# Patient Record
Sex: FEMALE | Race: WHITE | NOT HISPANIC OR LATINO | Employment: OTHER
[De-identification: names, ages, dates, MRNs, and addresses within clinical notes are randomized per-mention and may not be internally consistent; named-entity substitution may affect disease eponyms.]

---

## 2017-12-04 ENCOUNTER — RECORDS - HEALTHEAST (OUTPATIENT)
Dept: ADMINISTRATIVE | Facility: OTHER | Age: 77
End: 2017-12-04

## 2020-01-27 ENCOUNTER — RECORDS - HEALTHEAST (OUTPATIENT)
Dept: ADMINISTRATIVE | Facility: OTHER | Age: 80
End: 2020-01-27

## 2021-05-24 ENCOUNTER — RECORDS - HEALTHEAST (OUTPATIENT)
Dept: ADMINISTRATIVE | Facility: CLINIC | Age: 81
End: 2021-05-24

## 2021-05-25 ENCOUNTER — RECORDS - HEALTHEAST (OUTPATIENT)
Dept: ADMINISTRATIVE | Facility: CLINIC | Age: 81
End: 2021-05-25

## 2021-05-26 ENCOUNTER — RECORDS - HEALTHEAST (OUTPATIENT)
Dept: ADMINISTRATIVE | Facility: CLINIC | Age: 81
End: 2021-05-26

## 2021-05-27 ENCOUNTER — RECORDS - HEALTHEAST (OUTPATIENT)
Dept: ADMINISTRATIVE | Facility: CLINIC | Age: 81
End: 2021-05-27

## 2021-05-28 ENCOUNTER — RECORDS - HEALTHEAST (OUTPATIENT)
Dept: ADMINISTRATIVE | Facility: CLINIC | Age: 81
End: 2021-05-28

## 2021-05-29 ENCOUNTER — RECORDS - HEALTHEAST (OUTPATIENT)
Dept: ADMINISTRATIVE | Facility: CLINIC | Age: 81
End: 2021-05-29

## 2021-05-30 ENCOUNTER — RECORDS - HEALTHEAST (OUTPATIENT)
Dept: ADMINISTRATIVE | Facility: CLINIC | Age: 81
End: 2021-05-30

## 2021-05-31 ENCOUNTER — RECORDS - HEALTHEAST (OUTPATIENT)
Dept: ADMINISTRATIVE | Facility: CLINIC | Age: 81
End: 2021-05-31

## 2021-06-02 ENCOUNTER — RECORDS - HEALTHEAST (OUTPATIENT)
Dept: ADMINISTRATIVE | Facility: CLINIC | Age: 81
End: 2021-06-02

## 2021-07-13 ENCOUNTER — RECORDS - HEALTHEAST (OUTPATIENT)
Dept: ADMINISTRATIVE | Facility: CLINIC | Age: 81
End: 2021-07-13

## 2021-07-21 ENCOUNTER — RECORDS - HEALTHEAST (OUTPATIENT)
Dept: ADMINISTRATIVE | Facility: CLINIC | Age: 81
End: 2021-07-21

## 2022-03-21 ENCOUNTER — HOSPITAL ENCOUNTER (EMERGENCY)
Facility: CLINIC | Age: 82
Discharge: HOME | End: 2022-03-21
Attending: EMERGENCY MEDICINE | Admitting: EMERGENCY MEDICINE
Payer: MEDICARE

## 2022-03-21 ENCOUNTER — APPOINTMENT (OUTPATIENT)
Dept: ULTRASOUND IMAGING | Facility: CLINIC | Age: 82
End: 2022-03-21
Attending: EMERGENCY MEDICINE
Payer: MEDICARE

## 2022-03-21 VITALS
HEART RATE: 65 BPM | BODY MASS INDEX: 25.78 KG/M2 | HEIGHT: 64 IN | OXYGEN SATURATION: 98 % | RESPIRATION RATE: 18 BRPM | SYSTOLIC BLOOD PRESSURE: 122 MMHG | DIASTOLIC BLOOD PRESSURE: 68 MMHG | WEIGHT: 151 LBS | TEMPERATURE: 98.1 F

## 2022-03-21 DIAGNOSIS — M79.604 BILATERAL LEG PAIN: ICD-10-CM

## 2022-03-21 DIAGNOSIS — M79.605 BILATERAL LEG PAIN: ICD-10-CM

## 2022-03-21 LAB
ANION GAP SERPL CALCULATED.3IONS-SCNC: 10 MMOL/L (ref 5–18)
BASOPHILS # BLD AUTO: 0.1 10E3/UL (ref 0–0.2)
BASOPHILS NFR BLD AUTO: 2 %
BUN SERPL-MCNC: 11 MG/DL (ref 8–28)
CALCIUM SERPL-MCNC: 9.1 MG/DL (ref 8.5–10.5)
CHLORIDE BLD-SCNC: 99 MMOL/L (ref 98–107)
CK SERPL-CCNC: 256 U/L (ref 30–190)
CO2 SERPL-SCNC: 21 MMOL/L (ref 22–31)
CREAT SERPL-MCNC: 0.77 MG/DL (ref 0.6–1.1)
EOSINOPHIL # BLD AUTO: 0.2 10E3/UL (ref 0–0.7)
EOSINOPHIL NFR BLD AUTO: 6 %
ERYTHROCYTE [DISTWIDTH] IN BLOOD BY AUTOMATED COUNT: 14.5 % (ref 10–15)
GFR SERPL CREATININE-BSD FRML MDRD: 78 ML/MIN/1.73M2
GLUCOSE BLD-MCNC: 100 MG/DL (ref 70–125)
HCT VFR BLD AUTO: 39.7 % (ref 35–47)
HGB BLD-MCNC: 13.5 G/DL (ref 11.7–15.7)
IMM GRANULOCYTES # BLD: 0 10E3/UL
IMM GRANULOCYTES NFR BLD: 0 %
LYMPHOCYTES # BLD AUTO: 1.5 10E3/UL (ref 0.8–5.3)
LYMPHOCYTES NFR BLD AUTO: 39 %
MCH RBC QN AUTO: 29.7 PG (ref 26.5–33)
MCHC RBC AUTO-ENTMCNC: 34 G/DL (ref 31.5–36.5)
MCV RBC AUTO: 87 FL (ref 78–100)
MONOCYTES # BLD AUTO: 0.5 10E3/UL (ref 0–1.3)
MONOCYTES NFR BLD AUTO: 15 %
NEUTROPHILS # BLD AUTO: 1.4 10E3/UL (ref 1.6–8.3)
NEUTROPHILS NFR BLD AUTO: 38 %
NRBC # BLD AUTO: 0 10E3/UL
NRBC BLD AUTO-RTO: 0 /100
PLATELET # BLD AUTO: 217 10E3/UL (ref 150–450)
POTASSIUM BLD-SCNC: 4.2 MMOL/L (ref 3.5–5)
RBC # BLD AUTO: 4.55 10E6/UL (ref 3.8–5.2)
SODIUM SERPL-SCNC: 130 MMOL/L (ref 136–145)
WBC # BLD AUTO: 3.7 10E3/UL (ref 4–11)

## 2022-03-21 PROCEDURE — 36415 COLL VENOUS BLD VENIPUNCTURE: CPT | Performed by: EMERGENCY MEDICINE

## 2022-03-21 PROCEDURE — 80048 BASIC METABOLIC PNL TOTAL CA: CPT | Performed by: EMERGENCY MEDICINE

## 2022-03-21 PROCEDURE — 93970 EXTREMITY STUDY: CPT

## 2022-03-21 PROCEDURE — 85004 AUTOMATED DIFF WBC COUNT: CPT | Performed by: EMERGENCY MEDICINE

## 2022-03-21 PROCEDURE — 99284 EMERGENCY DEPT VISIT MOD MDM: CPT | Mod: 25

## 2022-03-21 PROCEDURE — 82550 ASSAY OF CK (CPK): CPT | Performed by: EMERGENCY MEDICINE

## 2022-03-21 NOTE — ED PROVIDER NOTES
EMERGENCY DEPARTMENT ENCOUNTER      NAME: Teetee Tanner  AGE: 82 year old female  YOB: 1940  MRN: [id number]  EVALUATION DATE & TIME: 3/21/2022  9:11 AM    PCP: Jasmyn Tony    ED PROVIDER: Juan Garcia M.D.      Chief Complaint   Patient presents with     Leg Pain         FINAL IMPRESSION:  1.  Chronic mitochondrial muscle dysfunction.  2.  Acute bilateral leg pain.      ED COURSE & MEDICAL DECISION MAKIN:14 AM I met with the patient to gather history and to perform my initial exam. We discussed plans for the ED course, including diagnostic testing and treatment. PPE worn: cloth mask.  Patient is underlying mitochondrial muscle dysfunction with recurrent myalgias and myositis.  Symptoms usually in her legs but sometimes in her arms.  Patient notes a bilateral leg pain although right is greater than left.  Symptoms coming on over the last week.  No chest pain, shortness of breath, back pain or hemoptysis.  10:54 AM I rechecked and updated the patient on results.  Sodium a touch low at 130 and CK mildly elevated to 56.  The rest laboratory work unremarkable.  Legs negative for DVT.  Patient will be discharged home.  We talked about ice or heat, Tylenol or ibuprofen.  Patient in agreement.      We discussed the plan for discharge and the patient is agreeable. Reviewed supportive cares, symptomatic treatment, outpatient follow up, and reasons to return to the Emergency Department. All questions and concerns were addressed. Patient to be discharged by ED RN.        Pertinent Labs & Imaging studies reviewed. (See chart for details)  82 year old female presents to the Emergency Department for evaluation of bilateral leg pain.    At the conclusion of the encounter I discussed the results of all of the tests and the disposition. The questions were answered. The patient or family acknowledged understanding and was agreeable with the care plan.       MEDICATIONS GIVEN IN THE  EMERGENCY:  Medications - No data to display    NEW PRESCRIPTIONS STARTED AT TODAY'S ER VISIT  New Prescriptions    No medications on file          =================================================================    HPI    Patient information was obtained from: patient     Use of : N/A        Teetee Tanner is a 82 year old female with a pertinent history of mitochondrial muscle dysfunction, who presents to this ED by walk in for evaluation of leg pain.    Patient presents with 6-7 days of bilateral lower leg pain and swelling (R>L). She reports frequent walking but denies recent falls or trauma. Current pain is improved from last night. Rates current pain 7/10. Patient was chronic leg and occasional arm pain related to mitochondrial muscle dysfunction. She typically uses KT tape but it did not provide pain relief last night. Does not identify any waxing or waning symptoms otherwise, exacerbating or alleviating features, associated symptoms except as mentioned.     REVIEW OF SYSTEMS   Review of Systems   Cardiovascular: Positive for leg swelling (bilateral lower legs, R>L).   Musculoskeletal:        Positive for bilateral lower leg pain (R>L).   All other systems reviewed and are negative.       PAST MEDICAL HISTORY:  No past medical history on file.    PAST SURGICAL HISTORY:  Past Surgical History:   Procedure Laterality Date     EXCISE BREAST CYST/FIBROADENOMA/TUMOR/DUCT LESION/NIPPLE LESION/AREOLAR LESION      Description: Breast Surgery Lumpectomy;  Recorded: 02/27/2008;     HC EXCISION NASAL POLYP(S), EXTENSIVE      Description: Extensive Excision Of Nasal Polyps;  Recorded: 02/27/2008;     HC REMOVE TONSILS/ADENOIDS,<11 Y/O      Description: Tonsillectomy With Adenoidectomy;  Recorded: 02/27/2008;     NM VAGINAL HYSTERECTOMY,UTERUS 250 GMS/<      Description: Vaginal Hysterectomy;  Proc Date: 02/25/2005;  Comments: BSO, Bladder repair           CURRENT MEDICATIONS:    ondansetron (ZOFRAN-ODT) 4  "MG disintegrating tablet        ALLERGIES:  Allergies   Allergen Reactions     Atorvastatin Unknown     Cephalosporins Unknown     Codeine Hives     Diphenhydramine Hcl [Diphenhydramine] Hives     Morphine Unknown     Penicillins Hives     Sulfa (Sulfonamide Antibiotics) [Sulfa Drugs] Hives     Tetracyclines Unknown       FAMILY HISTORY:  No family history on file.    SOCIAL HISTORY:   Social History     Socioeconomic History     Marital status:      Spouse name: Not on file     Number of children: Not on file     Years of education: Not on file     Highest education level: Not on file   Occupational History     Not on file   Tobacco Use     Smoking status: Never Smoker     Smokeless tobacco: Not on file   Substance and Sexual Activity     Alcohol use: No     Drug use: No     Sexual activity: Not on file   Other Topics Concern     Not on file   Social History Narrative     Not on file     Social Determinants of Health     Financial Resource Strain: Not on file   Food Insecurity: Not on file   Transportation Needs: Not on file   Physical Activity: Not on file   Stress: Not on file   Social Connections: Not on file   Intimate Partner Violence: Not on file   Housing Stability: Not on file   No current drugs, alcohol, tobacco.    VITALS:  /77   Pulse 64   Temp 98.1  F (36.7  C) (Tympanic)   Resp 16   Ht 1.626 m (5' 4\")   Wt 68.5 kg (151 lb)   SpO2 96%   BMI 25.92 kg/m      PHYSICAL EXAM    Vital Signs:  /77   Pulse 64   Temp 98.1  F (36.7  C) (Tympanic)   Resp 16   Ht 1.626 m (5' 4\")   Wt 68.5 kg (151 lb)   SpO2 96%   BMI 25.92 kg/m    General:  On entering the room she is in no apparent distress.    Neck:  Neck supple with full range of motion and nontender.    Back:  Back and spine are nontender.  No costovertebral angle tenderness.    HEENT:  Oropharynx clear with moist mucous membranes.  HEENT unremarkable.    Pulmonary:  Chest clear to auscultation without rhonchi rales or wheezing. "    Cardiovascular:  Cardiac regular rate and rhythm without murmurs rubs or gallops.    Abdomen:  Abdomen soft nontender.  There is no rebound or guarding.    Muskuloskeletal:  she moves all 4 without any difficulty and has normal neurovascular exams.  Extremities without clubbing, cyanosis, or edema.  Legs and calves are moderately tender bilaterally.  Right more tender than left.  Right calf proximally 1 inch larger in circumference compared to the left.  Neuro:  she is alert and oriented ×3 and moves all extremities symmetrically.    Psych:  Normal affect.    Skin:  Unremarkable and warm and dry.       LAB:  All pertinent labs reviewed and interpreted.  Labs Ordered and Resulted from Time of ED Arrival to Time of ED Departure   BASIC METABOLIC PANEL - Abnormal       Result Value    Sodium 130 (*)     Potassium 4.2      Chloride 99      Carbon Dioxide (CO2) 21 (*)     Anion Gap 10      Urea Nitrogen 11      Creatinine 0.77      Calcium 9.1      Glucose 100      GFR Estimate 78     CK TOTAL - Abnormal     (*)    CBC WITH PLATELETS AND DIFFERENTIAL - Abnormal    WBC Count 3.7 (*)     RBC Count 4.55      Hemoglobin 13.5      Hematocrit 39.7      MCV 87      MCH 29.7      MCHC 34.0      RDW 14.5      Platelet Count 217      % Neutrophils 38      % Lymphocytes 39      % Monocytes 15      % Eosinophils 6      % Basophils 2      % Immature Granulocytes 0      NRBCs per 100 WBC 0      Absolute Neutrophils 1.4 (*)     Absolute Lymphocytes 1.5      Absolute Monocytes 0.5      Absolute Eosinophils 0.2      Absolute Basophils 0.1      Absolute Immature Granulocytes 0.0      Absolute NRBCs 0.0         RADIOLOGY:  Reviewed all pertinent imaging. Please see official radiology report.  US Lower Extremity Venous Duplex Bilateral   Final Result   IMPRESSION:   1.  No deep venous thrombosis in the bilateral lower extremities.                 PROCEDURES:         Odalis FIGUEREDO, am serving as a scribe to document services  personally performed by Dr. Garcia based on my observation and the provider's statements to me. I, Juan Garcia MD attest that Odalisjenna French is acting in a scribe capacity, has observed my performance of the services and has documented them in accordance with my direction.    Juan Garcia M.D.  Emergency Medicine  [facility] EMERGENCY ROOM  [address] [id number] [phone]  Dept: [phone]       Juan Garcia MD  03/21/22 1057

## 2022-03-21 NOTE — DISCHARGE INSTRUCTIONS
Ice or heat off-and-on to legs can help with pain.  Tylenol or ibuprofen over-the-counter every 6 hours can help as needed and as tolerated.  Follow-up with your doctor in the next week or so to assess progress.

## 2022-03-21 NOTE — ED TRIAGE NOTES
Pt has mytochondrial muscle dysfuntion and has had worsening bilateral leg pain, right worse then left. Pt denies fall. Has not been able to sleep d/t pain. Pt has taken tylenol and tried hot packs. Here to be evaluated.

## 2022-04-14 ENCOUNTER — APPOINTMENT (OUTPATIENT)
Dept: CT IMAGING | Facility: CLINIC | Age: 82
End: 2022-04-14
Attending: EMERGENCY MEDICINE
Payer: MEDICARE

## 2022-04-14 ENCOUNTER — HOSPITAL ENCOUNTER (EMERGENCY)
Facility: CLINIC | Age: 82
Discharge: HOME | End: 2022-04-14
Attending: EMERGENCY MEDICINE | Admitting: EMERGENCY MEDICINE
Payer: MEDICARE

## 2022-04-14 ENCOUNTER — APPOINTMENT (OUTPATIENT)
Dept: RADIOLOGY | Facility: CLINIC | Age: 82
End: 2022-04-14
Attending: EMERGENCY MEDICINE
Payer: MEDICARE

## 2022-04-14 VITALS
SYSTOLIC BLOOD PRESSURE: 195 MMHG | OXYGEN SATURATION: 98 % | WEIGHT: 149 LBS | HEIGHT: 64 IN | DIASTOLIC BLOOD PRESSURE: 100 MMHG | BODY MASS INDEX: 25.44 KG/M2 | HEART RATE: 63 BPM | RESPIRATION RATE: 16 BRPM | TEMPERATURE: 97.6 F

## 2022-04-14 DIAGNOSIS — R42 VERTIGO: ICD-10-CM

## 2022-04-14 LAB
ANION GAP SERPL CALCULATED.3IONS-SCNC: 13 MMOL/L (ref 5–18)
BASOPHILS # BLD AUTO: 0.1 10E3/UL (ref 0–0.2)
BASOPHILS NFR BLD AUTO: 1 %
BUN SERPL-MCNC: 9 MG/DL (ref 8–28)
CALCIUM SERPL-MCNC: 9.5 MG/DL (ref 8.5–10.5)
CHLORIDE BLD-SCNC: 101 MMOL/L (ref 98–107)
CO2 SERPL-SCNC: 19 MMOL/L (ref 22–31)
CREAT SERPL-MCNC: 0.62 MG/DL (ref 0.6–1.1)
EOSINOPHIL # BLD AUTO: 0.2 10E3/UL (ref 0–0.7)
EOSINOPHIL NFR BLD AUTO: 3 %
ERYTHROCYTE [DISTWIDTH] IN BLOOD BY AUTOMATED COUNT: 14.5 % (ref 10–15)
GFR SERPL CREATININE-BSD FRML MDRD: 90 ML/MIN/1.73M2
GLUCOSE BLD-MCNC: 104 MG/DL (ref 70–125)
HCT VFR BLD AUTO: 42 % (ref 35–47)
HGB BLD-MCNC: 14.2 G/DL (ref 11.7–15.7)
IMM GRANULOCYTES # BLD: 0 10E3/UL
IMM GRANULOCYTES NFR BLD: 0 %
LYMPHOCYTES # BLD AUTO: 1.8 10E3/UL (ref 0.8–5.3)
LYMPHOCYTES NFR BLD AUTO: 32 %
MAGNESIUM SERPL-MCNC: 1.9 MG/DL (ref 1.8–2.6)
MCH RBC QN AUTO: 30.1 PG (ref 26.5–33)
MCHC RBC AUTO-ENTMCNC: 33.8 G/DL (ref 31.5–36.5)
MCV RBC AUTO: 89 FL (ref 78–100)
MONOCYTES # BLD AUTO: 0.6 10E3/UL (ref 0–1.3)
MONOCYTES NFR BLD AUTO: 11 %
NEUTROPHILS # BLD AUTO: 3 10E3/UL (ref 1.6–8.3)
NEUTROPHILS NFR BLD AUTO: 53 %
NRBC # BLD AUTO: 0 10E3/UL
NRBC BLD AUTO-RTO: 0 /100
PLATELET # BLD AUTO: 256 10E3/UL (ref 150–450)
POTASSIUM BLD-SCNC: 3.8 MMOL/L (ref 3.5–5)
RBC # BLD AUTO: 4.72 10E6/UL (ref 3.8–5.2)
SODIUM SERPL-SCNC: 133 MMOL/L (ref 136–145)
WBC # BLD AUTO: 5.7 10E3/UL (ref 4–11)

## 2022-04-14 PROCEDURE — 85025 COMPLETE CBC W/AUTO DIFF WBC: CPT | Performed by: EMERGENCY MEDICINE

## 2022-04-14 PROCEDURE — 83735 ASSAY OF MAGNESIUM: CPT | Performed by: EMERGENCY MEDICINE

## 2022-04-14 PROCEDURE — 72072 X-RAY EXAM THORAC SPINE 3VWS: CPT

## 2022-04-14 PROCEDURE — 36415 COLL VENOUS BLD VENIPUNCTURE: CPT | Performed by: EMERGENCY MEDICINE

## 2022-04-14 PROCEDURE — 70450 CT HEAD/BRAIN W/O DYE: CPT

## 2022-04-14 PROCEDURE — 250N000013 HC RX MED GY IP 250 OP 250 PS 637: Performed by: EMERGENCY MEDICINE

## 2022-04-14 PROCEDURE — 258N000003 HC RX IP 258 OP 636: Performed by: EMERGENCY MEDICINE

## 2022-04-14 PROCEDURE — 80048 BASIC METABOLIC PNL TOTAL CA: CPT | Performed by: EMERGENCY MEDICINE

## 2022-04-14 PROCEDURE — 96360 HYDRATION IV INFUSION INIT: CPT

## 2022-04-14 PROCEDURE — 96361 HYDRATE IV INFUSION ADD-ON: CPT

## 2022-04-14 PROCEDURE — 99285 EMERGENCY DEPT VISIT HI MDM: CPT | Mod: 25

## 2022-04-14 PROCEDURE — 93005 ELECTROCARDIOGRAM TRACING: CPT | Performed by: EMERGENCY MEDICINE

## 2022-04-14 RX ORDER — MECLIZINE HYDROCHLORIDE 25 MG/1
25 TABLET ORAL ONCE
Status: COMPLETED | OUTPATIENT
Start: 2022-04-14 | End: 2022-04-14

## 2022-04-14 RX ORDER — SODIUM CHLORIDE 9 MG/ML
INJECTION, SOLUTION INTRAVENOUS CONTINUOUS
Status: DISCONTINUED | OUTPATIENT
Start: 2022-04-14 | End: 2022-04-14

## 2022-04-14 RX ORDER — ONDANSETRON 2 MG/ML
4 INJECTION INTRAMUSCULAR; INTRAVENOUS EVERY 30 MIN PRN
Status: DISCONTINUED | OUTPATIENT
Start: 2022-04-14 | End: 2022-04-14 | Stop reason: HOSPADM

## 2022-04-14 RX ORDER — MECLIZINE HCL 12.5 MG 12.5 MG/1
25 TABLET ORAL 4 TIMES DAILY PRN
Qty: 30 TABLET | Refills: 0 | Status: SHIPPED | OUTPATIENT
Start: 2022-04-14 | End: 2022-07-05

## 2022-04-14 RX ADMIN — MECLIZINE HYDROCHLORIDE 25 MG: 25 TABLET ORAL at 11:56

## 2022-04-14 RX ADMIN — SODIUM CHLORIDE 1000 ML: 9 INJECTION, SOLUTION INTRAVENOUS at 11:54

## 2022-04-14 NOTE — ED TRIAGE NOTES
Patient presents tot he ED with complaints of dizziness that started yesterday. She reports she has experienced this before after reading a lot and using the computer. She reports when she lays down her dizziness resolves.

## 2022-04-14 NOTE — ED PROVIDER NOTES
EMERGENCY DEPARTMENT ENCOUNTER      NAME: Teetee Tanner  AGE: 82 year old female  YOB: 1940  MRN: [id number]  EVALUATION DATE & TIME: 4/14/2022 10:07 AM    PCP: Jasmyn Tony    ED PROVIDER: Dany Orta M.D.      Chief Complaint   Patient presents with     Dizziness     Patient reports dizziness after a long day of reading and extra screen time. She reports this has happened before after straining her eyes. She is concerned as she has fallen several times. She reports that her dizziness is much less today after a nights sleep.       FINAL IMPRESSION:  1. Vertigo        ED COURSE & MEDICAL DECISION MAKING:    Pertinent Labs & Imaging studies reviewed. (See chart for details)  82 year old female presents to the Emergency Department for evaluation of dizziness. Patient appears non toxic with stable vitals signs, patient is afebrile, no tachycardia or hypoxia, no increased work of breathing. Overall exam is benign.  Lungs are clear and abdomen is benign.  Patient has a GCS of 15 with no focal neuro deficits.  She denies any true lightheadedness, no chest pain or shortness of breath, no presyncopal symptoms.  She endorses more of a room spinning, off-balance sensation, she did fall last night onto the carpet and hit her back and head.  Symptoms resolved at rest, given no persistent vertigo, benign neurologic exam, symptoms started last night, certainly no indication for code stroke.  History and exam not consistent with central cause as she has a completely benign neurologic exam.  Considered but low suspicion for intracranial bleed or mass, low suspicion for thoracic bony fracture.  Considered but low suspicion for electrolyte abnormality, dysrhythmia.  With benign neurologic exam, resolution of symptoms at rest, no indication for MRI imaging.  We will obtain CT imaging of the head, screening labs and thoracic spine plain films.  She has no cervical tenderness, ranges her neck freely, nothing  to suggest cervical fracture dislocation.  Patient was given IV fluids, Zofran and meclizine.    Reassessment: Labs and imaging studies reported no acute concerning findings.  ECG reported no acute discerning findings.  Following our interventions patient felt markedly improved and repeat exam is benign, patient states that her symptoms are all but resolved.  She was able to ambulate here without issues.  Likely, peripheral vertigo, suspect benign paroxysmal positional vertigo.  With negative work-up, marked improvement in well appearance feel she is safe for discharge and close follow-up.  Will discharge with a course of meclizine and recommend that she follows up with primary care in the next 5 to 7 days for continued outpatient management evaluation.  Discussed these findings recommendations the patient felt reassured and comfortable discharge.  Return precautions provided.    11:08 AM I met with the patient, obtained history, performed an initial exam, and discussed options and plan for diagnostics and treatment here in the ED.  1:36 PM Rechecked on patient. She feels much improved. Repeat exam is benign. Discussed findings and plan for discharge with close follow up.    At the conclusion of the encounter I discussed the results of all of the tests and the disposition. The questions were answered and return precautions provided. The patient or family acknowledged understanding and was agreeable with the care plan.       MEDICATIONS GIVEN IN THE EMERGENCY:  Medications   0.9% sodium chloride BOLUS (0 mLs Intravenous Stopped 4/14/22 1344)   meclizine (ANTIVERT) tablet 25 mg (25 mg Oral Given 4/14/22 1156)       NEW PRESCRIPTIONS STARTED AT TODAY'S ER VISIT  Discharge Medication List as of 4/14/2022  1:45 PM      START taking these medications    Details   meclizine (ANTIVERT) 12.5 MG tablet Take 2 tablets (25 mg) by mouth 4 times daily as needed for dizziness, Disp-30 tablet, R-0, Local Print            =================================================================    HPI    Patient information was obtained from: Patient    Use of Intrepreter: N/A      Teetee Tanner is a 82 year old female who presents for evaluation of persistent dizziness since last night. Patient reports she began to feel dizzy last night around 8:00 PM after reading a paper copy of a novel and a few hours of watching TV and being on the computer. States that she has had similar feelings of dizziness in the past after a prolonged time of watching TV or being on the computer. She describes her initial feelings of dizziness were more feeling light headed and off balance, as well as pain behind her eyes. She states that she went to bed, but woke up around 12:00 AM to go to the bathroom, and felt more room spinning dizziness when she stood up. She states that she had to sit on the edge of her bed for a few minutes, but when she stood up she fell and hit her back on her dresser, then her head on the carpet. She was able to stand up on her own power. She reports her dizziness is worsened while up and moving around, resolved at rest.    Patient reports that she had an episode of vomiting last night, none today. She endorses back pain from the fall last night, denies any neck pain. She denies chest pain, shortness of breath, or fevers. Denies any bowel or bladder symptoms. Denies any other concerns.      REVIEW OF SYSTEMS   Constitutional:  Denies fever, chills  Respiratory:  Denies productive cough or increased work of breathing  Cardiovascular:  Denies chest pain, palpitations  GI:  Denies abdominal pain or change in bowel or bladder habits. Positive for vomiting (last night).  Musculoskeletal:  Denies any new muscle/joint swelling, neck pain. Positive for back pain.  Skin:  Denies rash   Neurologic:  Denies focal weakness. Positive for light headedness, dizziness.  All systems negative except as marked.     PAST MEDICAL HISTORY:  History  "reviewed. No pertinent past medical history.    PAST SURGICAL HISTORY:  Past Surgical History:   Procedure Laterality Date     EXCISE BREAST CYST/FIBROADENOMA/TUMOR/DUCT LESION/NIPPLE LESION/AREOLAR LESION      Description: Breast Surgery Lumpectomy;  Recorded: 02/27/2008;     HC EXCISION NASAL POLYP(S), EXTENSIVE      Description: Extensive Excision Of Nasal Polyps;  Recorded: 02/27/2008;     HC REMOVE TONSILS/ADENOIDS,<11 Y/O      Description: Tonsillectomy With Adenoidectomy;  Recorded: 02/27/2008;     RI VAGINAL HYSTERECTOMY,UTERUS 250 GMS/<      Description: Vaginal Hysterectomy;  Proc Date: 02/25/2005;  Comments: BSO, Bladder repair         CURRENT MEDICATIONS:    Prior to Admission medications    Medication Sig Start Date End Date Taking? Authorizing Provider   ondansetron (ZOFRAN-ODT) 4 MG disintegrating tablet [ONDANSETRON (ZOFRAN-ODT) 4 MG DISINTEGRATING TABLET] Take 1 tablet (4 mg total) by mouth every 8 (eight) hours as needed. 12/9/18   Kalyn Bermudez MD        ALLERGIES:  Allergies   Allergen Reactions     Atorvastatin Unknown     Cephalosporins Unknown     Codeine Hives     Diphenhydramine Hcl [Diphenhydramine] Hives     Morphine Unknown     Penicillins Hives     Sulfa (Sulfonamide Antibiotics) [Sulfa Drugs] Hives     Tetracyclines Unknown       FAMILY HISTORY:  History reviewed. No pertinent family history.    SOCIAL HISTORY:   Social History     Socioeconomic History     Marital status:    Tobacco Use     Smoking status: Never Smoker   Substance and Sexual Activity     Alcohol use: No     Drug use: No       VITALS:  Patient Vitals for the past 24 hrs:   BP Temp Temp src Pulse Resp SpO2 Height Weight   04/14/22 1356 (!) 195/100 -- -- 63 16 98 % -- --   04/14/22 1108 (!) 192/83 -- -- 59 18 98 % -- --   04/14/22 0958 (!) 180/107 97.6  F (36.4  C) Oral 63 17 100 % 1.626 m (5' 4\") 67.6 kg (149 lb)        PHYSICAL EXAM    Constitutional:  Awake, alert, in no apparent distress  HENT:  " Normocephalic, Atraumatic. Bilateral external ears normal. Oropharynx moist. Nose normal. Neck- Normal range of motion with no guarding, No midline cervical tenderness, Supple, No stridor.   Eyes:  PERRL, EOMI with no signs of entrapment, Conjunctiva normal, No discharge.   Respiratory:  Normal breath sounds, No respiratory distress, No wheezing.    Cardiovascular:  Normal heart rate, Normal rhythm, No appreciable rubs or gallops.   GI:  Soft, No tenderness, No distension, No palpable masses  Musculoskeletal:  Intact distal pulses, No edema. Good range of motion in all major joints. No tenderness to palpation or major deformities noted.  Integument:  Warm, Dry, No erythema, No rash.   Neurologic:  Alert & oriented, Normal motor function, Normal sensory function, No focal deficits noted.  Cranial nerves II through XII intact, no dysarthria or dysmetria with normal finger-to-nose and heel-to-shin testing.  Normal head impulse test, no nystagmus, normal test of skew.  Normal lateral gaze bilaterally.  Psychiatric:  Affect normal, Judgment normal, Mood normal.     LAB:  All pertinent labs reviewed and interpreted.  Results for orders placed or performed during the hospital encounter of 04/14/22   CT Head w/o Contrast    Impression    IMPRESSION:  1.  No CT evidence for acute intracranial process.  2.  Brain atrophy and presumed chronic microvascular ischemic changes as above.   XR Thoracic Spine 3 Views    Impression    IMPRESSION: No fracture. Normal vertebral heights and alignment. Mild to moderate degenerative coronal scoliotic deformity. Moderate multilevel thoracic degenerative disc disease with loss of disc height and marginal osteophytic spurring about the disc   spaces. Normal extraspinal structures.    Basic metabolic panel   Result Value Ref Range    Sodium 133 (L) 136 - 145 mmol/L    Potassium 3.8 3.5 - 5.0 mmol/L    Chloride 101 98 - 107 mmol/L    Carbon Dioxide (CO2) 19 (L) 22 - 31 mmol/L    Anion Gap 13 5  - 18 mmol/L    Urea Nitrogen 9 8 - 28 mg/dL    Creatinine 0.62 0.60 - 1.10 mg/dL    Calcium 9.5 8.5 - 10.5 mg/dL    Glucose 104 70 - 125 mg/dL    GFR Estimate 90 >60 mL/min/1.73m2   Result Value Ref Range    Magnesium 1.9 1.8 - 2.6 mg/dL   CBC with platelets and differential   Result Value Ref Range    WBC Count 5.7 4.0 - 11.0 10e3/uL    RBC Count 4.72 3.80 - 5.20 10e6/uL    Hemoglobin 14.2 11.7 - 15.7 g/dL    Hematocrit 42.0 35.0 - 47.0 %    MCV 89 78 - 100 fL    MCH 30.1 26.5 - 33.0 pg    MCHC 33.8 31.5 - 36.5 g/dL    RDW 14.5 10.0 - 15.0 %    Platelet Count 256 150 - 450 10e3/uL    % Neutrophils 53 %    % Lymphocytes 32 %    % Monocytes 11 %    % Eosinophils 3 %    % Basophils 1 %    % Immature Granulocytes 0 %    NRBCs per 100 WBC 0 <1 /100    Absolute Neutrophils 3.0 1.6 - 8.3 10e3/uL    Absolute Lymphocytes 1.8 0.8 - 5.3 10e3/uL    Absolute Monocytes 0.6 0.0 - 1.3 10e3/uL    Absolute Eosinophils 0.2 0.0 - 0.7 10e3/uL    Absolute Basophils 0.1 0.0 - 0.2 10e3/uL    Absolute Immature Granulocytes 0.0 <=0.4 10e3/uL    Absolute NRBCs 0.0 10e3/uL       RADIOLOGY:  XR Thoracic Spine 3 Views   Final Result   IMPRESSION: No fracture. Normal vertebral heights and alignment. Mild to moderate degenerative coronal scoliotic deformity. Moderate multilevel thoracic degenerative disc disease with loss of disc height and marginal osteophytic spurring about the disc    spaces. Normal extraspinal structures.       CT Head w/o Contrast   Final Result   IMPRESSION:   1.  No CT evidence for acute intracranial process.   2.  Brain atrophy and presumed chronic microvascular ischemic changes as above.           EKG:    Sinus bradycardia, no specific ST acute ischemic changes, no concerning dysrhythmias or interval prolongation, no priors for comparison  I have independently reviewed and interpreted the EKG(s) documented above.      I, Luis F Posada, am serving as a scribe to document services personally performed by Dany Orta,  MD, based on my observation and the provider's statements to me. I, Dany Orta MD attest that Luis F Posada is acting in a scribe capacity, has observed my performance of the services and has documented them in accordance with my direction.    Dany Orta M.D.  Emergency Medicine  [facility] EMERGENCY ROOM  [address] [facility] [place] [address]  [phone]  Dept: [phone]     Dany Orta MD  04/14/22 6414

## 2022-04-15 LAB
ATRIAL RATE - MUSE: 58 BPM
DIASTOLIC BLOOD PRESSURE - MUSE: NORMAL MMHG
INTERPRETATION ECG - MUSE: NORMAL
P AXIS - MUSE: 42 DEGREES
PR INTERVAL - MUSE: 168 MS
QRS DURATION - MUSE: 94 MS
QT - MUSE: 478 MS
QTC - MUSE: 469 MS
R AXIS - MUSE: -25 DEGREES
SYSTOLIC BLOOD PRESSURE - MUSE: NORMAL MMHG
T AXIS - MUSE: 82 DEGREES
VENTRICULAR RATE- MUSE: 58 BPM

## 2022-05-13 ENCOUNTER — APPOINTMENT (OUTPATIENT)
Dept: CT IMAGING | Facility: CLINIC | Age: 82
End: 2022-05-13
Payer: MEDICARE

## 2022-05-13 ENCOUNTER — HOSPITAL ENCOUNTER (EMERGENCY)
Facility: CLINIC | Age: 82
Discharge: HOME | End: 2022-05-13
Admitting: PHYSICIAN ASSISTANT
Payer: MEDICARE

## 2022-05-13 VITALS
RESPIRATION RATE: 16 BRPM | SYSTOLIC BLOOD PRESSURE: 156 MMHG | OXYGEN SATURATION: 99 % | DIASTOLIC BLOOD PRESSURE: 83 MMHG | HEIGHT: 64 IN | TEMPERATURE: 97.8 F | WEIGHT: 150 LBS | HEART RATE: 77 BPM | BODY MASS INDEX: 25.61 KG/M2

## 2022-05-13 DIAGNOSIS — R10.11 RUQ ABDOMINAL PAIN: ICD-10-CM

## 2022-05-13 DIAGNOSIS — K29.70 GASTRITIS: ICD-10-CM

## 2022-05-13 PROBLEM — M16.12 LOCALIZED OSTEOARTHROSIS OF LEFT HIP: Status: ACTIVE | Noted: 2018-04-19

## 2022-05-13 PROBLEM — M18.12 ARTHRITIS OF CARPOMETACARPAL (CMC) JOINT OF LEFT THUMB: Status: ACTIVE | Noted: 2019-09-16

## 2022-05-13 PROBLEM — Z96.642 H/O TOTAL HIP ARTHROPLASTY, LEFT: Status: ACTIVE | Noted: 2018-09-11

## 2022-05-13 PROBLEM — D12.6 ADENOMATOUS POLYP OF COLON: Status: ACTIVE | Noted: 2017-09-20

## 2022-05-13 PROBLEM — M25.552 PAIN OF LEFT HIP JOINT: Status: ACTIVE | Noted: 2018-04-19

## 2022-05-13 LAB
ALBUMIN SERPL-MCNC: 3.8 G/DL (ref 3.5–5)
ALP SERPL-CCNC: 64 U/L (ref 45–120)
ALT SERPL W P-5'-P-CCNC: 16 U/L (ref 0–45)
ANION GAP SERPL CALCULATED.3IONS-SCNC: 6 MMOL/L (ref 5–18)
AST SERPL W P-5'-P-CCNC: 28 U/L (ref 0–40)
BASOPHILS # BLD AUTO: 0.1 10E3/UL (ref 0–0.2)
BASOPHILS NFR BLD AUTO: 2 %
BILIRUB DIRECT SERPL-MCNC: 0.1 MG/DL
BILIRUB SERPL-MCNC: 0.6 MG/DL (ref 0–1)
BUN SERPL-MCNC: 6 MG/DL (ref 8–28)
C REACTIVE PROTEIN LHE: <0.1 MG/DL (ref 0–0.8)
CALCIUM SERPL-MCNC: 9.3 MG/DL (ref 8.5–10.5)
CHLORIDE BLD-SCNC: 101 MMOL/L (ref 98–107)
CO2 SERPL-SCNC: 26 MMOL/L (ref 22–31)
CREAT SERPL-MCNC: 0.69 MG/DL (ref 0.6–1.1)
EOSINOPHIL # BLD AUTO: 0.3 10E3/UL (ref 0–0.7)
EOSINOPHIL NFR BLD AUTO: 8 %
ERYTHROCYTE [DISTWIDTH] IN BLOOD BY AUTOMATED COUNT: 14.8 % (ref 10–15)
GFR SERPL CREATININE-BSD FRML MDRD: 87 ML/MIN/1.73M2
GLUCOSE BLD-MCNC: 99 MG/DL (ref 70–125)
HCT VFR BLD AUTO: 39.8 % (ref 35–47)
HGB BLD-MCNC: 13.4 G/DL (ref 11.7–15.7)
HOLD SPECIMEN: NORMAL
IMM GRANULOCYTES # BLD: 0 10E3/UL
IMM GRANULOCYTES NFR BLD: 0 %
INR PPP: 1.02 (ref 0.85–1.15)
LIPASE SERPL-CCNC: 41 U/L (ref 0–52)
LYMPHOCYTES # BLD AUTO: 1.7 10E3/UL (ref 0.8–5.3)
LYMPHOCYTES NFR BLD AUTO: 38 %
MAGNESIUM SERPL-MCNC: 1.9 MG/DL (ref 1.8–2.6)
MCH RBC QN AUTO: 29.7 PG (ref 26.5–33)
MCHC RBC AUTO-ENTMCNC: 33.7 G/DL (ref 31.5–36.5)
MCV RBC AUTO: 88 FL (ref 78–100)
MONOCYTES # BLD AUTO: 0.6 10E3/UL (ref 0–1.3)
MONOCYTES NFR BLD AUTO: 13 %
NEUTROPHILS # BLD AUTO: 1.7 10E3/UL (ref 1.6–8.3)
NEUTROPHILS NFR BLD AUTO: 39 %
NRBC # BLD AUTO: 0 10E3/UL
NRBC BLD AUTO-RTO: 0 /100
PLATELET # BLD AUTO: 238 10E3/UL (ref 150–450)
POTASSIUM BLD-SCNC: 3.9 MMOL/L (ref 3.5–5)
PROT SERPL-MCNC: 7 G/DL (ref 6–8)
RBC # BLD AUTO: 4.51 10E6/UL (ref 3.8–5.2)
SODIUM SERPL-SCNC: 133 MMOL/L (ref 136–145)
WBC # BLD AUTO: 4.4 10E3/UL (ref 4–11)

## 2022-05-13 PROCEDURE — 250N000011 HC RX IP 250 OP 636: Performed by: PHYSICIAN ASSISTANT

## 2022-05-13 PROCEDURE — 85025 COMPLETE CBC W/AUTO DIFF WBC: CPT | Performed by: EMERGENCY MEDICINE

## 2022-05-13 PROCEDURE — 83690 ASSAY OF LIPASE: CPT | Performed by: EMERGENCY MEDICINE

## 2022-05-13 PROCEDURE — 86140 C-REACTIVE PROTEIN: CPT | Performed by: EMERGENCY MEDICINE

## 2022-05-13 PROCEDURE — 99285 EMERGENCY DEPT VISIT HI MDM: CPT | Mod: 25

## 2022-05-13 PROCEDURE — 250N000013 HC RX MED GY IP 250 OP 250 PS 637: Performed by: PHYSICIAN ASSISTANT

## 2022-05-13 PROCEDURE — 36415 COLL VENOUS BLD VENIPUNCTURE: CPT | Performed by: EMERGENCY MEDICINE

## 2022-05-13 PROCEDURE — 74177 CT ABD & PELVIS W/CONTRAST: CPT

## 2022-05-13 PROCEDURE — 82248 BILIRUBIN DIRECT: CPT | Performed by: EMERGENCY MEDICINE

## 2022-05-13 PROCEDURE — 83735 ASSAY OF MAGNESIUM: CPT | Performed by: EMERGENCY MEDICINE

## 2022-05-13 PROCEDURE — 85610 PROTHROMBIN TIME: CPT | Performed by: EMERGENCY MEDICINE

## 2022-05-13 PROCEDURE — 250N000009 HC RX 250: Performed by: PHYSICIAN ASSISTANT

## 2022-05-13 RX ORDER — IOPAMIDOL 755 MG/ML
100 INJECTION, SOLUTION INTRAVASCULAR ONCE
Status: COMPLETED | OUTPATIENT
Start: 2022-05-13 | End: 2022-05-13

## 2022-05-13 RX ADMIN — IOPAMIDOL 100 ML: 755 INJECTION, SOLUTION INTRAVENOUS at 13:00

## 2022-05-13 RX ADMIN — ALUMINUM HYDROXIDE, MAGNESIUM HYDROXIDE, AND SIMETHICONE 30 ML: 200; 200; 20 SUSPENSION ORAL at 12:06

## 2022-05-13 ASSESSMENT — ENCOUNTER SYMPTOMS
ABDOMINAL PAIN: 1
VOMITING: 0
DIARRHEA: 0
FATIGUE: 0
RESPIRATORY NEGATIVE: 1
FEVER: 0
NAUSEA: 0
CARDIOVASCULAR NEGATIVE: 1
MUSCULOSKELETAL NEGATIVE: 1
CHILLS: 0

## 2022-05-13 NOTE — DISCHARGE INSTRUCTIONS
Your work-up here in the emergency department is unremarkable for acute pathology.  There are findings on the CT scan that have been previously documented on your MRI that will need further work-up.    In the meantime we will attempt a 2-week course of omeprazole therapy.  This may help your symptoms of abdominal pain that seem to recur with eating.  Please follow-up with your primary care as needed.

## 2022-05-13 NOTE — ED PROVIDER NOTES
EMERGENCY DEPARTMENT ENCOUNTER      NAME: Teetee Tanner  AGE: 82 year old female  YOB: 1940  MRN: [id number]  EVALUATION DATE & TIME: No admission date for patient encounter.    PCP: Jasmyn Tony    ED PROVIDER: Vinod Radford PA-C      Chief Complaint   Patient presents with     Abdominal Pain         FINAL IMPRESSION:  1. RUQ abdominal pain    2. Gastritis          MEDICAL DECISION MAKING:    Pertinent Labs & Imaging studies reviewed. (See chart for details)  82 year old female presents to the Emergency Department for evaluation of right-sided abdominal pain.    After obtaining history present illness, reviewing vitals, examining the patient and reviewing labs that have been drawn as well as her work-up as an outpatient I did recommend that we acutely image her abdomen in some fashion whether it be CT scan or ultrasound today for reassurance that nothing acutely has changed since her last imaging.  She has had a previous right upper quadrant ultrasound on 5/4, and then a follow-up MRI on 5/9.  There are findings on the studies that require repeat outpatient follow-up in 6 months, however it did not appear that there is anything acute on these images.  Nothing obvious for the cause of her right upper quadrant pain that has been present for weeks.  At this point time the patient feels comfortable with a CT scan.  All the screening labs are unremarkable at this time.    Patient CT scan returns reassuring.  No acute pathology that would require hospitalization or surgical intervention from an emergent standpoint.  At this point time plan to have the patient trial a course of omeprazole for 2 weeks and follow-up with her primary care as needed.  Primary care will be following the MRI and CT findings with regards to the cystic lesions on the liver and kidneys for outpatient follow-up as needed.  Overall patient happy with plan of care.    ED COURSE    I met with the patient, obtained history,  performed an initial exam, and discussed options and plan for diagnostics and treatment here in the ED.    At the conclusion of the encounter I discussed the results of all of the tests and the disposition. The questions were answered. The patient or family acknowledged understanding and was agreeable with the care plan.     MEDICATIONS GIVEN IN THE EMERGENCY:  Medications   lidocaine (viscous) (XYLOCAINE) 2 % 15 mL, alum & mag hydroxide-simethicone (MAALOX) 15 mL GI Cocktail (30 mLs Oral Given 5/13/22 1206)   iopamidol (ISOVUE-370) solution 100 mL (100 mLs Intravenous Given 5/13/22 1300)       NEW PRESCRIPTIONS STARTED AT TODAY'S ER VISIT  New Prescriptions    OMEPRAZOLE (PRILOSEC) 20 MG DR CAPSULE    Take 1 capsule (20 mg) by mouth 2 times daily for 15 days            =================================================================    HPI    Patient information was obtained from:   Teetee Tanner is a 82 year old female with a pertinent history of acid reflux who presents to this ED for evaluation of continued complaints of right-sided abdominal pain.  Patient reports that for the last 2 to 3 weeks she has had off-and-on right-sided abdominal pain that seems to worsen when she eats.  During follow-up through her primary care clinic she brought this up and she did have some screening blood work as well as a right upper quadrant ultrasound performed.  The ultrasound did reveal gallbladder polyps all of which are small but no gallbladder wall thickening and no obvious gallstones.  There was also a large renal cyst reported were recommendations for outpatient MRI were made.  She did have the MRI performed on 5/9 and ultimately there are findings that basically they are recommending 6-month follow-up imaging.  Patient is concerned about these findings as her  had multiple myeloma.  She is very anxious about a more concerning diagnosis rather than benign cysts.  She is also concerned that she has not had pain  on this side where the renal cyst and gallbladder polyps are so she seems still concerned that there is no solution for her pain that she has been feeling over the last 2 to 3 weeks.  She originally thought this was indigestion.  She denies any acute chest pain or acute shortness of breath.  No reports of cough.      REVIEW OF SYSTEMS   Review of Systems   Constitutional: Negative for chills, fatigue and fever.   Respiratory: Negative.    Cardiovascular: Negative.    Gastrointestinal: Positive for abdominal pain. Negative for diarrhea, nausea and vomiting.   Genitourinary: Negative.    Musculoskeletal: Negative.    Skin: Negative.    All other systems reviewed and are negative.         PAST MEDICAL HISTORY:  No past medical history on file.    PAST SURGICAL HISTORY:  Past Surgical History:   Procedure Laterality Date     EXCISE BREAST CYST/FIBROADENOMA/TUMOR/DUCT LESION/NIPPLE LESION/AREOLAR LESION      Description: Breast Surgery Lumpectomy;  Recorded: 02/27/2008;     HC EXCISION NASAL POLYP(S), EXTENSIVE      Description: Extensive Excision Of Nasal Polyps;  Recorded: 02/27/2008;     HC REMOVE TONSILS/ADENOIDS,<11 Y/O      Description: Tonsillectomy With Adenoidectomy;  Recorded: 02/27/2008;     [place] VAGINAL HYSTERECTOMY,UTERUS 250 GMS/<      Description: Vaginal Hysterectomy;  Proc Date: 02/25/2005;  Comments: BSO, Bladder repair         CURRENT MEDICATIONS:    No current facility-administered medications for this encounter.    Current Outpatient Medications:      omeprazole (PRILOSEC) 20 MG DR capsule, Take 1 capsule (20 mg) by mouth 2 times daily for 15 days, Disp: 30 capsule, Rfl: 0     meclizine (ANTIVERT) 12.5 MG tablet, Take 2 tablets (25 mg) by mouth 4 times daily as needed for dizziness, Disp: 30 tablet, Rfl: 0     ondansetron (ZOFRAN-ODT) 4 MG disintegrating tablet, [ONDANSETRON (ZOFRAN-ODT) 4 MG DISINTEGRATING TABLET] Take 1 tablet (4 mg total) by mouth every 8 (eight) hours as needed., Disp: 12 tablet,  "Rfl: 0      ALLERGIES:  Allergies   Allergen Reactions     Atorvastatin Unknown     Cephalosporins Unknown     Codeine Hives     Diphenhydramine Hcl [Diphenhydramine] Hives     Morphine Unknown     Penicillins Hives     Sulfa (Sulfonamide Antibiotics) [Sulfa Drugs] Hives     Tetracyclines Unknown       FAMILY HISTORY:  No family history on file.    SOCIAL HISTORY:   Social History     Socioeconomic History     Marital status:    Tobacco Use     Smoking status: Never Smoker   Substance and Sexual Activity     Alcohol use: No     Drug use: No       VITALS:  Patient Vitals for the past 24 hrs:   BP Temp Temp src Pulse Resp SpO2 Height Weight   05/13/22 1203 (!) 156/83 -- -- 57 18 95 % -- --   05/13/22 0946 (!) 162/92 97.8  F (36.6  C) Temporal 65 16 96 % 1.626 m (5' 4\") 68 kg (150 lb)       PHYSICAL EXAM    Physical Exam  Vitals and nursing note reviewed.   Constitutional:       General: She is not in acute distress.     Appearance: She is normal weight. She is not ill-appearing or toxic-appearing.   HENT:      Head: Normocephalic.      Right Ear: External ear normal.      Left Ear: External ear normal.   Eyes:      General: No scleral icterus.     Conjunctiva/sclera: Conjunctivae normal.      Pupils: Pupils are equal, round, and reactive to light.   Cardiovascular:      Rate and Rhythm: Normal rate.   Pulmonary:      Effort: Pulmonary effort is normal. No respiratory distress.      Breath sounds: No wheezing.   Abdominal:      General: Abdomen is flat.      Palpations: Abdomen is soft.      Comments: Mild reproducible right right upper quadrant tenderness without guarding or rebound present.  No distention.   Musculoskeletal:         General: No swelling, deformity or signs of injury. Normal range of motion.      Cervical back: Normal range of motion.   Skin:     General: Skin is warm and dry.      Coloration: Skin is not jaundiced.   Neurological:      General: No focal deficit present.      Mental Status: She " is alert. Mental status is at baseline.      Sensory: No sensory deficit.      Motor: No weakness.   Psychiatric:         Mood and Affect: Mood normal.          LAB:  All pertinent labs reviewed and interpreted.  Results for orders placed or performed during the hospital encounter of 05/13/22   CT Abdomen Pelvis w Contrast    Impression    IMPRESSION:     1.  No obvious acute findings to explain symptoms.    2.  Hepatic and renal complex cystic change, as previously seen on recent MRI.       Hepatic function panel   Result Value Ref Range    Bilirubin Total 0.6 0.0 - 1.0 mg/dL    Bilirubin Direct 0.1 <=0.5 mg/dL    Protein Total 7.0 6.0 - 8.0 g/dL    Albumin 3.8 3.5 - 5.0 g/dL    Alkaline Phosphatase 64 45 - 120 U/L    AST 28 0 - 40 U/L    ALT 16 0 - 45 U/L   Result Value Ref Range    Lipase 41 0 - 52 U/L   Basic metabolic panel   Result Value Ref Range    Sodium 133 (L) 136 - 145 mmol/L    Potassium 3.9 3.5 - 5.0 mmol/L    Chloride 101 98 - 107 mmol/L    Carbon Dioxide (CO2) 26 22 - 31 mmol/L    Anion Gap 6 5 - 18 mmol/L    Urea Nitrogen 6 (L) 8 - 28 mg/dL    Creatinine 0.69 0.60 - 1.10 mg/dL    Calcium 9.3 8.5 - 10.5 mg/dL    Glucose 99 70 - 125 mg/dL    GFR Estimate 87 >60 mL/min/1.73m2   CRP inflammation   Result Value Ref Range    CRP <0.1 0.0 - 0.8 mg/dL   Result Value Ref Range    INR 1.02 0.85 - 1.15   Result Value Ref Range    Magnesium 1.9 1.8 - 2.6 mg/dL   CBC with platelets and differential   Result Value Ref Range    WBC Count 4.4 4.0 - 11.0 10e3/uL    RBC Count 4.51 3.80 - 5.20 10e6/uL    Hemoglobin 13.4 11.7 - 15.7 g/dL    Hematocrit 39.8 35.0 - 47.0 %    MCV 88 78 - 100 fL    MCH 29.7 26.5 - 33.0 pg    MCHC 33.7 31.5 - 36.5 g/dL    RDW 14.8 10.0 - 15.0 %    Platelet Count 238 150 - 450 10e3/uL    % Neutrophils 39 %    % Lymphocytes 38 %    % Monocytes 13 %    % Eosinophils 8 %    % Basophils 2 %    % Immature Granulocytes 0 %    NRBCs per 100 WBC 0 <1 /100    Absolute Neutrophils 1.7 1.6 - 8.3  10e3/uL    Absolute Lymphocytes 1.7 0.8 - 5.3 10e3/uL    Absolute Monocytes 0.6 0.0 - 1.3 10e3/uL    Absolute Eosinophils 0.3 0.0 - 0.7 10e3/uL    Absolute Basophils 0.1 0.0 - 0.2 10e3/uL    Absolute Immature Granulocytes 0.0 <=0.4 10e3/uL    Absolute NRBCs 0.0 10e3/uL   Extra Green Top (Lithium Heparin) Tube   Result Value Ref Range    Hold Specimen [facility]        RADIOLOGY:  Reviewed all pertinent imaging. Please see official radiology report.  CT Abdomen Pelvis w Contrast   Final Result   IMPRESSION:       1.  No obvious acute findings to explain symptoms.      2.  Hepatic and renal complex cystic change, as previously seen on recent MRI.                  Vinod Radford PA-C  Emergency Medicine  [facility]     Vinod Radford PA-C  05/13/22 9907

## 2022-05-13 NOTE — ED TRIAGE NOTES
Patient reports ongoing chronic RUQ abdominal pain, worsening over time. Reports lump to RUQ, had scan and MRI at [facility] and brought printed results. Pain 1/10 at baseline, increases to 6/10 with eating.     Triage Assessment     Row Name 05/13/22 0948       Triage Assessment (Adult)    Airway WDL WDL       Respiratory WDL    Respiratory WDL WDL       Skin Circulation/Temperature WDL    Skin Circulation/Temperature WDL WDL       Cardiac WDL    Cardiac WDL WDL       Peripheral/Neurovascular WDL    Peripheral Neurovascular WDL WDL       Cognitive/Neuro/Behavioral WDL    Cognitive/Neuro/Behavioral WDL WDL

## 2022-07-05 ENCOUNTER — APPOINTMENT (OUTPATIENT)
Dept: MRI IMAGING | Facility: CLINIC | Age: 82
End: 2022-07-05
Attending: FAMILY MEDICINE
Payer: MEDICARE

## 2022-07-05 ENCOUNTER — HOSPITAL ENCOUNTER (EMERGENCY)
Facility: CLINIC | Age: 82
Discharge: HOME | End: 2022-07-05
Attending: FAMILY MEDICINE | Admitting: FAMILY MEDICINE
Payer: MEDICARE

## 2022-07-05 VITALS
DIASTOLIC BLOOD PRESSURE: 81 MMHG | WEIGHT: 150 LBS | RESPIRATION RATE: 16 BRPM | TEMPERATURE: 98.1 F | BODY MASS INDEX: 25.75 KG/M2 | OXYGEN SATURATION: 98 % | HEART RATE: 57 BPM | SYSTOLIC BLOOD PRESSURE: 195 MMHG

## 2022-07-05 DIAGNOSIS — H81.399 PERIPHERAL VERTIGO, UNSPECIFIED LATERALITY: ICD-10-CM

## 2022-07-05 LAB
ANION GAP SERPL CALCULATED.3IONS-SCNC: 11 MMOL/L (ref 5–18)
ATRIAL RATE - MUSE: 61 BPM
BASOPHILS # BLD AUTO: 0 10E3/UL (ref 0–0.2)
BASOPHILS NFR BLD AUTO: 1 %
BUN SERPL-MCNC: 8 MG/DL (ref 8–28)
CALCIUM SERPL-MCNC: 9.1 MG/DL (ref 8.5–10.5)
CHLORIDE BLD-SCNC: 99 MMOL/L (ref 98–107)
CO2 SERPL-SCNC: 24 MMOL/L (ref 22–31)
CREAT SERPL-MCNC: 0.64 MG/DL (ref 0.6–1.1)
DIASTOLIC BLOOD PRESSURE - MUSE: 86 MMHG
EOSINOPHIL # BLD AUTO: 0 10E3/UL (ref 0–0.7)
EOSINOPHIL NFR BLD AUTO: 0 %
ERYTHROCYTE [DISTWIDTH] IN BLOOD BY AUTOMATED COUNT: 15.7 % (ref 10–15)
GFR SERPL CREATININE-BSD FRML MDRD: 89 ML/MIN/1.73M2
GLUCOSE BLD-MCNC: 140 MG/DL (ref 70–125)
HCT VFR BLD AUTO: 40.1 % (ref 35–47)
HGB BLD-MCNC: 13 G/DL (ref 11.7–15.7)
HOLD SPECIMEN: NORMAL
HOLD SPECIMEN: NORMAL
IMM GRANULOCYTES # BLD: 0 10E3/UL
IMM GRANULOCYTES NFR BLD: 0 %
INTERPRETATION ECG - MUSE: NORMAL
LYMPHOCYTES # BLD AUTO: 0.8 10E3/UL (ref 0.8–5.3)
LYMPHOCYTES NFR BLD AUTO: 10 %
MCH RBC QN AUTO: 30.2 PG (ref 26.5–33)
MCHC RBC AUTO-ENTMCNC: 32.4 G/DL (ref 31.5–36.5)
MCV RBC AUTO: 93 FL (ref 78–100)
MONOCYTES # BLD AUTO: 0.4 10E3/UL (ref 0–1.3)
MONOCYTES NFR BLD AUTO: 5 %
NEUTROPHILS # BLD AUTO: 6.7 10E3/UL (ref 1.6–8.3)
NEUTROPHILS NFR BLD AUTO: 84 %
NRBC # BLD AUTO: 0 10E3/UL
NRBC BLD AUTO-RTO: 0 /100
P AXIS - MUSE: 66 DEGREES
PLATELET # BLD AUTO: 220 10E3/UL (ref 150–450)
POTASSIUM BLD-SCNC: 3.6 MMOL/L (ref 3.5–5)
PR INTERVAL - MUSE: 188 MS
QRS DURATION - MUSE: 102 MS
QT - MUSE: 472 MS
QTC - MUSE: 475 MS
R AXIS - MUSE: -3 DEGREES
RBC # BLD AUTO: 4.31 10E6/UL (ref 3.8–5.2)
SODIUM SERPL-SCNC: 134 MMOL/L (ref 136–145)
SYSTOLIC BLOOD PRESSURE - MUSE: 205 MMHG
T AXIS - MUSE: 81 DEGREES
VENTRICULAR RATE- MUSE: 61 BPM
WBC # BLD AUTO: 8 10E3/UL (ref 4–11)

## 2022-07-05 PROCEDURE — 96375 TX/PRO/DX INJ NEW DRUG ADDON: CPT

## 2022-07-05 PROCEDURE — 85025 COMPLETE CBC W/AUTO DIFF WBC: CPT | Performed by: FAMILY MEDICINE

## 2022-07-05 PROCEDURE — 70553 MRI BRAIN STEM W/O & W/DYE: CPT | Mod: MA

## 2022-07-05 PROCEDURE — 93005 ELECTROCARDIOGRAM TRACING: CPT | Performed by: FAMILY MEDICINE

## 2022-07-05 PROCEDURE — 70549 MR ANGIOGRAPH NECK W/O&W/DYE: CPT | Mod: MA

## 2022-07-05 PROCEDURE — 250N000011 HC RX IP 250 OP 636: Performed by: FAMILY MEDICINE

## 2022-07-05 PROCEDURE — 99285 EMERGENCY DEPT VISIT HI MDM: CPT | Mod: 25

## 2022-07-05 PROCEDURE — 80048 BASIC METABOLIC PNL TOTAL CA: CPT | Performed by: FAMILY MEDICINE

## 2022-07-05 PROCEDURE — 96374 THER/PROPH/DIAG INJ IV PUSH: CPT | Mod: 59

## 2022-07-05 PROCEDURE — 250N000013 HC RX MED GY IP 250 OP 250 PS 637: Performed by: FAMILY MEDICINE

## 2022-07-05 PROCEDURE — 36415 COLL VENOUS BLD VENIPUNCTURE: CPT | Performed by: FAMILY MEDICINE

## 2022-07-05 PROCEDURE — 96376 TX/PRO/DX INJ SAME DRUG ADON: CPT

## 2022-07-05 PROCEDURE — 255N000002 HC RX 255 OP 636: Performed by: FAMILY MEDICINE

## 2022-07-05 PROCEDURE — A9585 GADOBUTROL INJECTION: HCPCS | Performed by: FAMILY MEDICINE

## 2022-07-05 PROCEDURE — 70544 MR ANGIOGRAPHY HEAD W/O DYE: CPT | Mod: MA

## 2022-07-05 RX ORDER — MECLIZINE HYDROCHLORIDE 25 MG/1
25 TABLET ORAL ONCE
Status: COMPLETED | OUTPATIENT
Start: 2022-07-05 | End: 2022-07-05

## 2022-07-05 RX ORDER — ONDANSETRON 2 MG/ML
4 INJECTION INTRAMUSCULAR; INTRAVENOUS ONCE
Status: COMPLETED | OUTPATIENT
Start: 2022-07-05 | End: 2022-07-05

## 2022-07-05 RX ORDER — MECLIZINE HYDROCHLORIDE 25 MG/1
25 TABLET ORAL 4 TIMES DAILY PRN
Qty: 20 TABLET | Refills: 0 | Status: SHIPPED | OUTPATIENT
Start: 2022-07-05

## 2022-07-05 RX ORDER — LORAZEPAM 2 MG/ML
0.5 INJECTION INTRAMUSCULAR ONCE
Status: COMPLETED | OUTPATIENT
Start: 2022-07-05 | End: 2022-07-05

## 2022-07-05 RX ORDER — LORAZEPAM 0.5 MG/1
0.5 TABLET ORAL EVERY 6 HOURS PRN
Qty: 20 TABLET | Refills: 0 | Status: SHIPPED | OUTPATIENT
Start: 2022-07-05

## 2022-07-05 RX ORDER — ONDANSETRON 4 MG/1
4 TABLET, ORALLY DISINTEGRATING ORAL EVERY 6 HOURS PRN
Qty: 20 TABLET | Refills: 0 | Status: SHIPPED | OUTPATIENT
Start: 2022-07-05 | End: 2022-07-08

## 2022-07-05 RX ORDER — GADOBUTROL 604.72 MG/ML
10 INJECTION INTRAVENOUS ONCE
Status: COMPLETED | OUTPATIENT
Start: 2022-07-05 | End: 2022-07-05

## 2022-07-05 RX ADMIN — ONDANSETRON 4 MG: 2 INJECTION INTRAMUSCULAR; INTRAVENOUS at 14:08

## 2022-07-05 RX ADMIN — MECLIZINE HYDROCHLORIDE 25 MG: 25 TABLET ORAL at 14:10

## 2022-07-05 RX ADMIN — GADOBUTROL 10 ML: 604.72 INJECTION INTRAVENOUS at 16:59

## 2022-07-05 RX ADMIN — LORAZEPAM 0.5 MG: 2 INJECTION INTRAMUSCULAR; INTRAVENOUS at 15:59

## 2022-07-05 RX ADMIN — ONDANSETRON 4 MG: 2 INJECTION INTRAMUSCULAR; INTRAVENOUS at 18:13

## 2022-07-05 NOTE — ED TRIAGE NOTES
Arrived [facility] history of Vertigo takes Meclizine. Called EMS due to complain of head pressure and nausea and vomiting. Received Zofran 4 mg en route to the ER.  Wally Felix RN  7/5/2022  12:58 PM         Triage Assessment     Row Name 07/05/22 1256       Triage Assessment (Adult)    Airway WDL WDL       Respiratory WDL    Respiratory WDL WDL       Cardiac WDL    Cardiac WDL WDL       Peripheral/Neurovascular WDL    Peripheral Neurovascular WDL WDL       Cognitive/Neuro/Behavioral WDL    Cognitive/Neuro/Behavioral WDL WDL       Louisa Coma Scale    Best Eye Response 4-->(E4) spontaneous    Best Motor Response 6-->(M6) obeys commands    Best Verbal Response 5-->(V5) oriented    Louisa Coma Scale Score 15

## 2022-07-05 NOTE — ED PROVIDER NOTES
EMERGENCY DEPARTMENT ENCOUNTER      NAME: Teetee Tanner  AGE: 82 year old female  YOB: 1940  MRN: [id number]  EVALUATION DATE & TIME: 7/5/2022 12:47 PM    PCP: Jasmyn Tony    ED PROVIDER: Dru Jesus M.D.    Chief Complaint   Patient presents with     Nausea, Vomiting, & Diarrhea       FINAL IMPRESSION:  1. Peripheral vertigo, unspecified laterality        ED COURSE & MEDICAL DECISION MAKING:    Pertinent Labs & Imaging studies personally reviewed and interpreted by me. (See chart for details)  1:15 PM patient seen and examined, prior records reviewed.  Differential diagnosis includes but not limited to CVA, TIA, labyrinthitis, vestibular neuritis, Ménière's disease, electrolyte disturbance, otitis, MI.  Patient presents with vertigo, gradual onset starting last night and worse today.  Also generalized headache.  On exam, patient is laying with her eyes covered.  No focal weakness.  Patient had similar symptoms a couple months ago, head CT was negative.  This likely represents peripheral process, however with recurrent episode accompanied by headache today and hypertension, concern for intracranial pathology.  MRI is ordered.  Patient also had some vomiting and diarrhea earlier today.  On exam here, abdomen is soft with no tenderness.  Patient is given Zofran and meclizine for dizziness and nausea  5:55 PM MRI is reassuring.  Patient is feeling better after Zofran, meclizine, and Ativan.  Patient is stable for discharge with outpatient follow-up.    At the conclusion of the encounter I discussed the results of all of the tests and the disposition. The questions were answered. The patient or family acknowledged understanding and was agreeable with the care plan.     PROCEDURES:   Procedures    MEDICATIONS GIVEN IN THE EMERGENCY:  Medications   meclizine (ANTIVERT) tablet 25 mg (25 mg Oral Given 7/5/22 1410)   ondansetron (ZOFRAN) injection 4 mg (4 mg Intravenous Given 7/5/22 1408)    gadobutrol (GADAVIST) injection 10 mL (10 mLs Intravenous Given 7/5/22 5200)   LORazepam (ATIVAN) injection 0.5 mg (0.5 mg Intravenous Given 7/5/22 4114)       NEW PRESCRIPTIONS STARTED AT TODAY'S ER VISIT  New Prescriptions    No medications on file       =================================================================    HPI    Patient information was obtained from: patient      Teetee Tanner is a 82 year old female with a pertinent history of vertigo starting last night.  Woke this morning with vertigo and right sided headache.  Room spinning, no lightheadedness.  Worse with movement.  Had similar in April.  Nausea with dizziness, does have vomiting.  Also some abdominal pain and cramping, loose stools today x5.  No chest pain, no SOB, no recent illness.  Bilateral foot numbness for a couple weeks.    REVIEW OF SYSTEMS   Review of Systems   All other systems reviewed and negative    PAST MEDICAL HISTORY:  No past medical history on file.    PAST SURGICAL HISTORY:  Past Surgical History:   Procedure Laterality Date     EXCISE BREAST CYST/FIBROADENOMA/TUMOR/DUCT LESION/NIPPLE LESION/AREOLAR LESION      Description: Breast Surgery Lumpectomy;  Recorded: 02/27/2008;     HC EXCISION NASAL POLYP(S), EXTENSIVE      Description: Extensive Excision Of Nasal Polyps;  Recorded: 02/27/2008;     HC REMOVE TONSILS/ADENOIDS,<13 Y/O      Description: Tonsillectomy With Adenoidectomy;  Recorded: 02/27/2008;     [place] VAGINAL HYSTERECTOMY,UTERUS 250 GMS/<      Description: Vaginal Hysterectomy;  Proc Date: 02/25/2005;  Comments: BSO, Bladder repair       CURRENT MEDICATIONS:    No current facility-administered medications for this encounter.     Current Outpatient Medications   Medication     meclizine (ANTIVERT) 12.5 MG tablet     ondansetron (ZOFRAN-ODT) 4 MG disintegrating tablet       ALLERGIES:  Allergies   Allergen Reactions     Atorvastatin Unknown     Cephalosporins Unknown     Codeine Hives     Diphenhydramine Hcl  [Diphenhydramine] Hives     Morphine Unknown     Penicillins Hives     Sulfa (Sulfonamide Antibiotics) [Sulfa Drugs] Hives     Tetracyclines Unknown       FAMILY HISTORY:  No family history on file.    SOCIAL HISTORY:   Social History     Socioeconomic History     Marital status:    Tobacco Use     Smoking status: Never Smoker   Substance and Sexual Activity     Alcohol use: No     Drug use: No       VITALS:  BP (!) 205/86   Pulse 61   Temp 97.6  F (36.4  C) (Oral)   Resp 25   Wt 68 kg (150 lb)   SpO2 99%   BMI 25.75 kg/m      PHYSICAL EXAM:  Physical Exam  Vitals and nursing note reviewed.   Constitutional:       Appearance: Normal appearance.   HENT:      Head: Normocephalic and atraumatic.      Right Ear: External ear normal.      Left Ear: External ear normal.      Nose: Nose normal.      Mouth/Throat:      Mouth: Mucous membranes are moist.   Eyes:      Extraocular Movements: Extraocular movements intact.      Conjunctiva/sclera: Conjunctivae normal.      Pupils: Pupils are equal, round, and reactive to light.   Cardiovascular:      Rate and Rhythm: Normal rate and regular rhythm.   Pulmonary:      Effort: Pulmonary effort is normal.      Breath sounds: Normal breath sounds. No wheezing or rales.   Abdominal:      General: Abdomen is flat. There is no distension.      Palpations: Abdomen is soft.      Tenderness: There is no abdominal tenderness. There is no guarding.   Musculoskeletal:         General: Normal range of motion.      Cervical back: Normal range of motion and neck supple.      Right lower leg: No edema.      Left lower leg: No edema.   Lymphadenopathy:      Cervical: No cervical adenopathy.   Skin:     General: Skin is warm and dry.   Neurological:      General: No focal deficit present.      Mental Status: She is alert and oriented to person, place, and time. Mental status is at baseline.      Comments: No gross focal neurologic deficits   Psychiatric:         Mood and Affect: Mood  normal.         Behavior: Behavior normal.         Thought Content: Thought content normal.          LAB:  All pertinent labs reviewed and interpreted.  Results for orders placed or performed during the hospital encounter of 07/05/22   MR Brain w/o & w Contrast    Impression    IMPRESSION:    HEAD MRI:  1. Senescent intracranial changes and sequelae of mild chronic microangiopathy without acute intracranial abnormality     HEAD MRA:   1. Unremarkable intracranial vasculature.    NECK MRA:  1. Unremarkable neck vasculature.   MRA Brain ([place]) wo Contrast    Impression    IMPRESSION:    HEAD MRI:  1. Senescent intracranial changes and sequelae of mild chronic microangiopathy without acute intracranial abnormality     HEAD MRA:   1. Unremarkable intracranial vasculature.    NECK MRA:  1. Unremarkable neck vasculature.   MRA Neck (Carotids) wo & w Contrast    Impression    IMPRESSION:    HEAD MRI:  1. Senescent intracranial changes and sequelae of mild chronic microangiopathy without acute intracranial abnormality     HEAD MRA:   1. Unremarkable intracranial vasculature.    NECK MRA:  1. Unremarkable neck vasculature.   Basic metabolic panel   Result Value Ref Range    Sodium 134 (L) 136 - 145 mmol/L    Potassium 3.6 3.5 - 5.0 mmol/L    Chloride 99 98 - 107 mmol/L    Carbon Dioxide (CO2) 24 22 - 31 mmol/L    Anion Gap 11 5 - 18 mmol/L    Urea Nitrogen 8 8 - 28 mg/dL    Creatinine 0.64 0.60 - 1.10 mg/dL    Calcium 9.1 8.5 - 10.5 mg/dL    Glucose 140 (H) 70 - 125 mg/dL    GFR Estimate 89 >60 mL/min/1.73m2   CBC with platelets and differential   Result Value Ref Range    WBC Count 8.0 4.0 - 11.0 10e3/uL    RBC Count 4.31 3.80 - 5.20 10e6/uL    Hemoglobin 13.0 11.7 - 15.7 g/dL    Hematocrit 40.1 35.0 - 47.0 %    MCV 93 78 - 100 fL    MCH 30.2 26.5 - 33.0 pg    MCHC 32.4 31.5 - 36.5 g/dL    RDW 15.7 (H) 10.0 - 15.0 %    Platelet Count 220 150 - 450 10e3/uL    % Neutrophils 84 %    % Lymphocytes 10 %    %  Monocytes 5 %    % Eosinophils 0 %    % Basophils 1 %    % Immature Granulocytes 0 %    NRBCs per 100 WBC 0 <1 /100    Absolute Neutrophils 6.7 1.6 - 8.3 10e3/uL    Absolute Lymphocytes 0.8 0.8 - 5.3 10e3/uL    Absolute Monocytes 0.4 0.0 - 1.3 10e3/uL    Absolute Eosinophils 0.0 0.0 - 0.7 10e3/uL    Absolute Basophils 0.0 0.0 - 0.2 10e3/uL    Absolute Immature Granulocytes 0.0 <=0.4 10e3/uL    Absolute NRBCs 0.0 10e3/uL   Extra Blue Top Tube   Result Value Ref Range    Hold Specimen [facility]    Extra Red Top Tube   Result Value Ref Range    Hold Specimen JIC    ECG 12-LEAD WITH MUSE (LHE)   Result Value Ref Range    Systolic Blood Pressure 205 mmHg    Diastolic Blood Pressure 86 mmHg    Ventricular Rate 61 BPM    Atrial Rate 61 BPM    AR Interval 188 ms    QRS Duration 102 ms     ms    QTc 475 ms    P Axis 66 degrees    R AXIS -3 degrees    T Axis 81 degrees    Interpretation ECG       Sinus rhythm  Normal ECG  When compared with ECG of 14-APR-2022 11:48,  No significant change was found  Confirmed by SEE ED PROVIDER NOTE FOR, ECG INTERPRETATION ([id number]),  AUBREE VALIENTE ([id number]) on 7/5/2022 5:10:17 PM         RADIOLOGY:  Reviewed all pertinent imaging. Please see official radiology report.  MR Brain w/o & w Contrast   Final Result   IMPRESSION:      HEAD MRI:   1. Senescent intracranial changes and sequelae of mild chronic microangiopathy without acute intracranial abnormality       HEAD MRA:    1. Unremarkable intracranial vasculature.      NECK MRA:   1. Unremarkable neck vasculature.      MRA Brain ([place]) wo Contrast   Final Result   IMPRESSION:      HEAD MRI:   1. Senescent intracranial changes and sequelae of mild chronic microangiopathy without acute intracranial abnormality       HEAD MRA:    1. Unremarkable intracranial vasculature.      NECK MRA:   1. Unremarkable neck vasculature.      MRA Neck (Carotids) wo & w Contrast   Final Result   IMPRESSION:      HEAD MRI:   1. Senescent  intracranial changes and sequelae of mild chronic microangiopathy without acute intracranial abnormality       HEAD MRA:    1. Unremarkable intracranial vasculature.      NECK MRA:   1. Unremarkable neck vasculature.        EKG:    Performed at: 1:20 PM  Impression: Normal EKG  Rate: 61  Rhythm: Sinus  Axis: Normal  GA Interval: 188  QRS Interval: 102  QTc Interval: 475  ST Changes: No acute ischemic changes  Comparison: April 2022, no acute changes    Dru Jesus M.D.  Emergency Medicine  [facility] EMERGENCY ROOM  [address] [facility] [id number]  [phone]  Dept: [phone]     Dru Jesus MD  07/05/22 1969

## 2023-02-24 ENCOUNTER — HOSPITAL ENCOUNTER (EMERGENCY)
Facility: CLINIC | Age: 83
Discharge: HOME | End: 2023-02-24
Attending: FAMILY MEDICINE | Admitting: FAMILY MEDICINE
Payer: MEDICARE

## 2023-02-24 ENCOUNTER — APPOINTMENT (OUTPATIENT)
Dept: ULTRASOUND IMAGING | Facility: CLINIC | Age: 83
End: 2023-02-24
Attending: FAMILY MEDICINE
Payer: MEDICARE

## 2023-02-24 ENCOUNTER — NURSE TRIAGE (OUTPATIENT)
Dept: NURSING | Facility: CLINIC | Age: 83
End: 2023-02-24
Payer: MEDICARE

## 2023-02-24 VITALS
DIASTOLIC BLOOD PRESSURE: 81 MMHG | OXYGEN SATURATION: 99 % | TEMPERATURE: 97.9 F | HEART RATE: 64 BPM | RESPIRATION RATE: 18 BRPM | SYSTOLIC BLOOD PRESSURE: 174 MMHG

## 2023-02-24 DIAGNOSIS — S86.812A STRAIN OF LEFT CALF MUSCLE: ICD-10-CM

## 2023-02-24 PROCEDURE — 93971 EXTREMITY STUDY: CPT | Mod: LT

## 2023-02-24 PROCEDURE — 99284 EMERGENCY DEPT VISIT MOD MDM: CPT | Mod: 25

## 2023-02-24 RX ORDER — ALBUTEROL SULFATE 90 UG/1
2 AEROSOL, METERED RESPIRATORY (INHALATION) EVERY 4 HOURS PRN
COMMUNITY
Start: 2023-02-11

## 2023-02-24 RX ORDER — ACETAMINOPHEN 325 MG/1
650 TABLET ORAL ONCE
Status: COMPLETED | OUTPATIENT
Start: 2023-02-24 | End: 2023-02-24

## 2023-02-24 RX ORDER — LEVOTHYROXINE SODIUM 100 MCG
100 TABLET ORAL
COMMUNITY
Start: 2023-02-02

## 2023-02-24 ASSESSMENT — ENCOUNTER SYMPTOMS
MYALGIAS: 1
SHORTNESS OF BREATH: 0
ARTHRALGIAS: 0

## 2023-02-24 ASSESSMENT — ACTIVITIES OF DAILY LIVING (ADL): ADLS_ACUITY_SCORE: 35

## 2023-02-24 NOTE — ED TRIAGE NOTES
Pt arrives to ED with c/o left calf pain for the past couple of days. Denies injury, chest pain, or shortness of breath. Hypertensive in triage denies hypertension hx.      Triage Assessment     Row Name 02/24/23 1227       Triage Assessment (Adult)    Airway WDL WDL       Respiratory WDL    Respiratory WDL WDL       Skin Circulation/Temperature WDL    Skin Circulation/Temperature WDL WDL       Cardiac WDL    Cardiac WDL WDL       Peripheral/Neurovascular WDL    Peripheral Neurovascular WDL WDL       Cognitive/Neuro/Behavioral WDL    Cognitive/Neuro/Behavioral WDL WDL       Louisa Coma Scale    Best Eye Response 4-->(E4) spontaneous    Best Motor Response 6-->(M6) obeys commands    Best Verbal Response 5-->(V5) oriented    Louisa Coma Scale Score 15

## 2023-02-24 NOTE — ED PROVIDER NOTES
EMERGENCY DEPARTMENT ENCOUNTER      NAME: Teetee Tanner  AGE: 83 year old female  YOB: 1940  MRN: [id number]  EVALUATION DATE & TIME: 2023 12:29 PM    PCP: Jasmyn Tony    ED PROVIDER: Dru Jesus M.D.    Chief Complaint   Patient presents with     Leg Pain       FINAL IMPRESSION:  1. Strain of left calf muscle        ED COURSE & MEDICAL DECISION MAKIN:33 PM I met with the patient to obtain patient history and performed a physical exam. Discussed plan for ED work up including potential diagnostic studies and interventions.  Patient seen and examined, external records reviewed.  Patient presents with atraumatic left calf pain after shoveling snow a couple days ago.  On exam, she does have some tenderness of the muscular calf and pain with active or passive ankle extension.  On exam seems most consistent with musculoskeletal pain but cannot exclude DVT and so ultrasound is ordered.  Consider x-rays but no history of trauma.  No redness or warmth in the area to suggest cellulitis or abscess.  1:28 PM ultrasound independently interpreted by me does not demonstrate any acute thrombus or Baker's cyst.  Discussed diagnosis and plan with patient, continue conservative management and follow-up with primary care as needed.    Pertinent Labs & Imaging studies independently interpreted by me. (See chart for details)    At the conclusion of the encounter I discussed the results of all of the tests and the disposition. The questions were answered. The patient or family acknowledged understanding and was agreeable with the care plan.     Medical Decision Making    History:    Supplemental history from: Documented in chart, if applicable    External Record(s) reviewed: Documented in chart, if applicable.    Work Up:    Chart documentation includes differential considered and any EKGs or imaging independently interpreted by provider, where specified.    In additional to work up  documented, I considered the following work up: Documented in chart, if applicable.    External consultation:    Discussion of management with another provider: Documented in chart, if applicable    Complicating factors:    Care impacted by chronic illness: Hyperlipidemia    Care affected by social determinants of health: N/A    Disposition considerations: Discharge. No recommendations on prescription strength medication(s). See documentation for any additional details.        PROCEDURES:       MEDICATIONS GIVEN IN THE EMERGENCY:  Medications   acetaminophen (TYLENOL) tablet 650 mg (has no administration in time range)       NEW PRESCRIPTIONS STARTED AT TODAY'S ER VISIT  New Prescriptions    No medications on file       =================================================================    HPI    Patient information was obtained from: patient      Teetee Tanner is a 83 year old female with a pertinent history of hyperlipidemia, myalgia and myositis, DJD, mitochondrial myopathy, who presents to this ED via self walk-in for evaluation of leg pain.    Patient reports she was shoveling snow on Wednesday (2/22) and after shoveling a large pile, she suddenly started to have left calf pain. She states since then the pain has been constant and is worse with certain kinds of movements like dorsiflexion of the foot. She took tylenol at night, hot baths, put compression stockings on, elevated the leg, and used KT tape with no relief.     She denies chest pain, shortness of breath, lower extremity edema, and ankle pain. She denies recent fall or injury. There were no other concerns/complaints at this time.       REVIEW OF SYSTEMS   Review of Systems   Respiratory: Negative for shortness of breath.    Cardiovascular: Negative for chest pain and leg swelling.   Musculoskeletal: Positive for myalgias (left calf pain). Negative for arthralgias (ankle pain).   All other systems reviewed and are negative.     All other systems  "reviewed and negative    PAST MEDICAL HISTORY:  History reviewed. No pertinent past medical history.    PAST SURGICAL HISTORY:  Past Surgical History:   Procedure Laterality Date     EXCISE BREAST CYST/FIBROADENOMA/TUMOR/DUCT LESION/NIPPLE LESION/AREOLAR LESION      Description: Breast Surgery Lumpectomy;  Recorded: 02/27/2008;     HC EXCISION NASAL POLYP(S), EXTENSIVE      Description: Extensive Excision Of Nasal Polyps;  Recorded: 02/27/2008;     HC REMOVE TONSILS/ADENOIDS,<13 Y/O      Description: Tonsillectomy With Adenoidectomy;  Recorded: 02/27/2008;     VA VAGINAL HYSTERECTOMY,UTERUS 250 GMS/<      Description: Vaginal Hysterectomy;  Proc Date: 02/25/2005;  Comments: BSO, Bladder repair       CURRENT MEDICATIONS:    Current Facility-Administered Medications   Medication     acetaminophen (TYLENOL) tablet 650 mg     Current Outpatient Medications   Medication     diclofenac (VOLTAREN) 1 % topical gel     albuterol (PROAIR HFA/PROVENTIL HFA/VENTOLIN HFA) 108 (90 Base) MCG/ACT inhaler     LORazepam (ATIVAN) 0.5 MG tablet     meclizine (ANTIVERT) 25 MG tablet     SYNTHROID 100 MCG tablet       ALLERGIES:  Allergies   Allergen Reactions     Celery Oil Hives     Other reaction(s): Cutaneous reactions     Hmg-Coa-R Inhibitors Other (See Comments)     Pt has mitochondrial disorder. Seen at [facility] for this. Told never to take statins       Morphine Unknown and Other (See Comments)     Other reaction(s): \"Makes me hyper\"  Makes me hyper       Mushroom GI Disturbance     Cause vomiting     Oxycodone      Atorvastatin Unknown     Cephalosporins Unknown     Codeine Hives     Diphenhydramine Hcl [Diphenhydramine] Hives     Nitrofurantoin      Other reaction(s): Muscle Aches/Weakness     Other (Do Not Use)      Other reaction(s): Throat Irritation  Mold and dust     Penicillins Hives     Sulfa Drugs Hives     Tetracyclines Unknown     Fumaric Acid Rash     Pineapple Other (See Comments)     Difficulty swallowing. Also her " mouth gets raw.       Proparacaine Rash     Tomato Other (See Comments)     Raw mouth         FAMILY HISTORY:  History reviewed. No pertinent family history.    SOCIAL HISTORY:   Social History     Socioeconomic History     Marital status:      Spouse name: None     Number of children: None     Years of education: None     Highest education level: None   Tobacco Use     Smoking status: Never   Substance and Sexual Activity     Alcohol use: No     Drug use: No       VITALS:  BP (!) 214/97   Pulse 64   Temp 97.9  F (36.6  C) (Temporal)   Resp 18   SpO2 99%     PHYSICAL EXAM:  Physical Exam  Vitals and nursing note reviewed.   Constitutional:       Appearance: Normal appearance.   HENT:      Head: Normocephalic and atraumatic.      Right Ear: External ear normal.      Left Ear: External ear normal.      Nose: Nose normal.      Mouth/Throat:      Mouth: Mucous membranes are moist.   Eyes:      Extraocular Movements: Extraocular movements intact.      Conjunctiva/sclera: Conjunctivae normal.      Pupils: Pupils are equal, round, and reactive to light.   Cardiovascular:      Rate and Rhythm: Normal rate and regular rhythm.   Pulmonary:      Effort: Pulmonary effort is normal.      Breath sounds: Normal breath sounds. No wheezing or rales.   Abdominal:      General: Abdomen is flat. There is no distension.      Palpations: Abdomen is soft.      Tenderness: There is no abdominal tenderness. There is no guarding.   Musculoskeletal:         General: Normal range of motion.      Cervical back: Normal range of motion and neck supple.      Right lower leg: No edema.      Left lower leg: Tenderness (left calf tenderness with positive katherine's sign) present. No edema.   Lymphadenopathy:      Cervical: No cervical adenopathy.   Skin:     General: Skin is warm and dry.   Neurological:      General: No focal deficit present.      Mental Status: She is alert and oriented to person, place, and time. Mental status is at  baseline.      Comments: No gross focal neurologic deficits   Psychiatric:         Mood and Affect: Mood normal.         Behavior: Behavior normal.         Thought Content: Thought content normal.          LAB:  All pertinent labs reviewed and interpreted.  Results for orders placed or performed during the hospital encounter of 02/24/23   US Lower Extremity Venous Duplex Left    Impression    IMPRESSION:    No deep venous thrombosis in the left lower extremity.       RADIOLOGY:  Reviewed all pertinent imaging. Please see official radiology report.  US Lower Extremity Venous Duplex Left   Final Result   IMPRESSION:      No deep venous thrombosis in the left lower extremity.        I, Marni Nunez, am serving as a scribe to document services personally performed by Dr. Jesus based on my observation and the provider's statements to me. I, Dru Jesus MD attest that Marni Nunez is acting in a scribe capacity, has observed my performance of the services and has documented them in accordance with my direction.    Dru Jesus M.D.  Emergency Medicine  [facility] EMERGENCY ROOM  [address] [facility] [phone]  Dept: [phone]     Dru Jesus MD  02/24/23 [id number]

## 2023-02-24 NOTE — TELEPHONE ENCOUNTER
Is this a 2nd Level Triage? YES, LICENSED PRACTITIONER REVIEW IS REQUIRED    Situation: Left leg (calf) pain.    Background:   Pt reports pain started about 3 days ago in her calf. Pt reports she has pain in her legs because of a muscle disorder. Pt is a [facility] patient.     Assessment:   Pt reports no swelling or redness, but has pain in the joints, but this is nothing new for her, as she has arthritis. Pt reports she thinks she may have over stressed the muscle. Pt is able to walk, but it is painful. Pt denies any chest pain or difficulty breathing. Pt reports this pain is in her left calf, and has been going on for about 3 days.    Protocol Recommended Disposition:   Go To ED/UCC (Or To Office With PCP Approval)  Second Level Triage    Recommendation:   Pt was advised to go to the ED/UCC to be evaluated. No Second Level Triage will be done, as patient is not a [facility] patient. Pt refuses disposition, and she thought this line was [facility], so she will call [facility]. Pt was advised to call back if symptoms worsen.    Pt verbalized understanding.    Dorian Cason RN on 2/24/2023 at 11:36 AM    Reason for Disposition    Thigh or calf pain in only one leg and present > 1 hour    Additional Information    Negative: Looks like a broken bone or dislocated joint (e.g., crooked or deformed)    Negative: Sounds like a life-threatening emergency to the triager    Negative: Followed a hip injury    Negative: Followed a knee injury    Negative: Followed an ankle or foot injury    Negative: Back pain radiating (shooting) into leg(s)    Negative: Foot pain is main symptom    Negative: Ankle pain is main symptom    Negative: Knee pain is main symptom    Negative: Leg swelling is main symptom    Negative: Chest pain    Negative: Difficulty breathing    Negative: Entire foot is cool or blue in comparison to other side    Negative: Unable to walk    Negative: Fever and red area (or area very tender to touch)    Negative:  Swollen joint and fever    Protocols used: LEG PAIN-A-[place]

## 2024-04-10 ENCOUNTER — APPOINTMENT (OUTPATIENT)
Dept: RADIOLOGY | Facility: CLINIC | Age: 84
End: 2024-04-10
Payer: MEDICARE

## 2024-04-10 ENCOUNTER — HOSPITAL ENCOUNTER (OUTPATIENT)
Facility: CLINIC | Age: 84
Setting detail: OBSERVATION
Discharge: HOME | End: 2024-04-12
Attending: EMERGENCY MEDICINE | Admitting: HOSPITALIST
Payer: MEDICARE

## 2024-04-10 DIAGNOSIS — J10.1 INFLUENZA A: ICD-10-CM

## 2024-04-10 DIAGNOSIS — J45.901 ASTHMA WITH ACUTE EXACERBATION, UNSPECIFIED ASTHMA SEVERITY, UNSPECIFIED WHETHER PERSISTENT: ICD-10-CM

## 2024-04-10 LAB
ANION GAP SERPL CALCULATED.3IONS-SCNC: 9 MMOL/L (ref 7–15)
ATRIAL RATE - MUSE: 74 BPM
BASOPHILS # BLD AUTO: 0.1 10E3/UL (ref 0–0.2)
BASOPHILS NFR BLD AUTO: 2 %
BUN SERPL-MCNC: 7.3 MG/DL (ref 8–23)
CALCIUM SERPL-MCNC: 8.8 MG/DL (ref 8.8–10.2)
CHLORIDE SERPL-SCNC: 94 MMOL/L (ref 98–107)
CREAT SERPL-MCNC: 0.57 MG/DL (ref 0.51–0.95)
DEPRECATED HCO3 PLAS-SCNC: 26 MMOL/L (ref 22–29)
DIASTOLIC BLOOD PRESSURE - MUSE: NORMAL MMHG
EGFRCR SERPLBLD CKD-EPI 2021: 90 ML/MIN/1.73M2
EOSINOPHIL # BLD AUTO: 0 10E3/UL (ref 0–0.7)
EOSINOPHIL NFR BLD AUTO: 1 %
ERYTHROCYTE [DISTWIDTH] IN BLOOD BY AUTOMATED COUNT: 15.2 % (ref 10–15)
FLUAV RNA SPEC QL NAA+PROBE: POSITIVE
FLUBV RNA RESP QL NAA+PROBE: NEGATIVE
GLUCOSE SERPL-MCNC: 94 MG/DL (ref 70–99)
HCT VFR BLD AUTO: 40.4 % (ref 35–47)
HGB BLD-MCNC: 13.5 G/DL (ref 11.7–15.7)
IMM GRANULOCYTES # BLD: 0 10E3/UL
IMM GRANULOCYTES NFR BLD: 0 %
INTERPRETATION ECG - MUSE: NORMAL
LYMPHOCYTES # BLD AUTO: 0.5 10E3/UL (ref 0.8–5.3)
LYMPHOCYTES NFR BLD AUTO: 18 %
MCH RBC QN AUTO: 30.5 PG (ref 26.5–33)
MCHC RBC AUTO-ENTMCNC: 33.4 G/DL (ref 31.5–36.5)
MCV RBC AUTO: 91 FL (ref 78–100)
MONOCYTES # BLD AUTO: 0.7 10E3/UL (ref 0–1.3)
MONOCYTES NFR BLD AUTO: 23 %
NEUTROPHILS # BLD AUTO: 1.7 10E3/UL (ref 1.6–8.3)
NEUTROPHILS NFR BLD AUTO: 57 %
NRBC # BLD AUTO: 0 10E3/UL
NRBC BLD AUTO-RTO: 0 /100
P AXIS - MUSE: 40 DEGREES
PLATELET # BLD AUTO: 158 10E3/UL (ref 150–450)
POTASSIUM SERPL-SCNC: 4.3 MMOL/L (ref 3.4–5.3)
PR INTERVAL - MUSE: 178 MS
QRS DURATION - MUSE: 98 MS
QT - MUSE: 400 MS
QTC - MUSE: 444 MS
R AXIS - MUSE: -31 DEGREES
RBC # BLD AUTO: 4.42 10E6/UL (ref 3.8–5.2)
RSV RNA SPEC NAA+PROBE: NEGATIVE
SARS-COV-2 RNA RESP QL NAA+PROBE: NEGATIVE
SODIUM SERPL-SCNC: 129 MMOL/L (ref 135–145)
SYSTOLIC BLOOD PRESSURE - MUSE: NORMAL MMHG
T AXIS - MUSE: 53 DEGREES
TROPONIN T SERPL HS-MCNC: 15 NG/L
TROPONIN T SERPL HS-MCNC: 16 NG/L
VENTRICULAR RATE- MUSE: 74 BPM
WBC # BLD AUTO: 3 10E3/UL (ref 4–11)

## 2024-04-10 PROCEDURE — 36415 COLL VENOUS BLD VENIPUNCTURE: CPT

## 2024-04-10 PROCEDURE — 99285 EMERGENCY DEPT VISIT HI MDM: CPT | Mod: 25

## 2024-04-10 PROCEDURE — 120N000001 HC R&B MED SURG/OB

## 2024-04-10 PROCEDURE — 96360 HYDRATION IV INFUSION INIT: CPT | Mod: 59

## 2024-04-10 PROCEDURE — 250N000009 HC RX 250: Performed by: HOSPITALIST

## 2024-04-10 PROCEDURE — 80048 BASIC METABOLIC PNL TOTAL CA: CPT

## 2024-04-10 PROCEDURE — 84484 ASSAY OF TROPONIN QUANT: CPT

## 2024-04-10 PROCEDURE — 250N000012 HC RX MED GY IP 250 OP 636 PS 637

## 2024-04-10 PROCEDURE — 250N000009 HC RX 250

## 2024-04-10 PROCEDURE — 85025 COMPLETE CBC W/AUTO DIFF WBC: CPT

## 2024-04-10 PROCEDURE — 258N000003 HC RX IP 258 OP 636

## 2024-04-10 PROCEDURE — 96361 HYDRATE IV INFUSION ADD-ON: CPT

## 2024-04-10 PROCEDURE — 87637 SARSCOV2&INF A&B&RSV AMP PRB: CPT | Performed by: EMERGENCY MEDICINE

## 2024-04-10 PROCEDURE — 96374 THER/PROPH/DIAG INJ IV PUSH: CPT

## 2024-04-10 PROCEDURE — 999N000157 HC STATISTIC RCP TIME EA 10 MIN

## 2024-04-10 PROCEDURE — 250N000013 HC RX MED GY IP 250 OP 250 PS 637: Performed by: HOSPITALIST

## 2024-04-10 PROCEDURE — 99223 1ST HOSP IP/OBS HIGH 75: CPT | Mod: AI | Performed by: HOSPITALIST

## 2024-04-10 PROCEDURE — 94640 AIRWAY INHALATION TREATMENT: CPT

## 2024-04-10 PROCEDURE — 71045 X-RAY EXAM CHEST 1 VIEW: CPT

## 2024-04-10 PROCEDURE — 250N000011 HC RX IP 250 OP 636: Performed by: HOSPITALIST

## 2024-04-10 PROCEDURE — 93005 ELECTROCARDIOGRAM TRACING: CPT

## 2024-04-10 PROCEDURE — 96375 TX/PRO/DX INJ NEW DRUG ADDON: CPT

## 2024-04-10 RX ORDER — VITAMIN B COMPLEX
25 TABLET ORAL DAILY
Status: DISCONTINUED | OUTPATIENT
Start: 2024-04-10 | End: 2024-04-12 | Stop reason: HOSPADM

## 2024-04-10 RX ORDER — POLYETHYLENE GLYCOL 3350 17 G/17G
17 POWDER, FOR SOLUTION ORAL 2 TIMES DAILY PRN
Status: DISCONTINUED | OUTPATIENT
Start: 2024-04-10 | End: 2024-04-12 | Stop reason: HOSPADM

## 2024-04-10 RX ORDER — METHYLPREDNISOLONE SODIUM SUCCINATE 40 MG/ML
40 INJECTION, POWDER, LYOPHILIZED, FOR SOLUTION INTRAMUSCULAR; INTRAVENOUS EVERY 8 HOURS
Qty: 4 ML | Refills: 0 | Status: COMPLETED | OUTPATIENT
Start: 2024-04-10 | End: 2024-04-11

## 2024-04-10 RX ORDER — BENZONATATE 100 MG/1
100 CAPSULE ORAL 3 TIMES DAILY PRN
Status: DISCONTINUED | OUTPATIENT
Start: 2024-04-10 | End: 2024-04-12 | Stop reason: HOSPADM

## 2024-04-10 RX ORDER — AMOXICILLIN 250 MG
2 CAPSULE ORAL 2 TIMES DAILY PRN
Status: DISCONTINUED | OUTPATIENT
Start: 2024-04-10 | End: 2024-04-12 | Stop reason: HOSPADM

## 2024-04-10 RX ORDER — PREDNISONE 20 MG/1
40 TABLET ORAL
Status: DISCONTINUED | OUTPATIENT
Start: 2024-04-12 | End: 2024-04-12 | Stop reason: HOSPADM

## 2024-04-10 RX ORDER — LISINOPRIL 5 MG/1
5 TABLET ORAL DAILY
Status: DISCONTINUED | OUTPATIENT
Start: 2024-04-10 | End: 2024-04-12 | Stop reason: HOSPADM

## 2024-04-10 RX ORDER — VITAMIN B COMPLEX
25 TABLET ORAL DAILY
COMMUNITY

## 2024-04-10 RX ORDER — FLUTICASONE PROPIONATE AND SALMETEROL XINAFOATE 115; 21 UG/1; UG/1
2 AEROSOL, METERED RESPIRATORY (INHALATION) 2 TIMES DAILY
COMMUNITY
Start: 2024-03-26 | End: 2025-03-26

## 2024-04-10 RX ORDER — LIDOCAINE 40 MG/G
CREAM TOPICAL
Status: DISCONTINUED | OUTPATIENT
Start: 2024-04-10 | End: 2024-04-12 | Stop reason: HOSPADM

## 2024-04-10 RX ORDER — LORAZEPAM 0.5 MG/1
0.5 TABLET ORAL EVERY 6 HOURS PRN
Status: DISCONTINUED | OUTPATIENT
Start: 2024-04-10 | End: 2024-04-12 | Stop reason: HOSPADM

## 2024-04-10 RX ORDER — AMOXICILLIN 250 MG
1 CAPSULE ORAL 2 TIMES DAILY PRN
Status: DISCONTINUED | OUTPATIENT
Start: 2024-04-10 | End: 2024-04-12 | Stop reason: HOSPADM

## 2024-04-10 RX ORDER — ALBUTEROL SULFATE 0.83 MG/ML
5 SOLUTION RESPIRATORY (INHALATION) ONCE
Status: COMPLETED | OUTPATIENT
Start: 2024-04-10 | End: 2024-04-10

## 2024-04-10 RX ORDER — ACETAMINOPHEN 650 MG/1
650 SUPPOSITORY RECTAL EVERY 4 HOURS PRN
Status: DISCONTINUED | OUTPATIENT
Start: 2024-04-10 | End: 2024-04-12 | Stop reason: HOSPADM

## 2024-04-10 RX ORDER — GUAIFENESIN 600 MG/1
600 TABLET, EXTENDED RELEASE ORAL 2 TIMES DAILY
Status: DISCONTINUED | OUTPATIENT
Start: 2024-04-10 | End: 2024-04-12 | Stop reason: HOSPADM

## 2024-04-10 RX ORDER — ALBUTEROL SULFATE 90 UG/1
2 AEROSOL, METERED RESPIRATORY (INHALATION) EVERY 4 HOURS PRN
Status: DISCONTINUED | OUTPATIENT
Start: 2024-04-10 | End: 2024-04-12 | Stop reason: HOSPADM

## 2024-04-10 RX ORDER — PREDNISONE 20 MG/1
40 TABLET ORAL ONCE
Status: COMPLETED | OUTPATIENT
Start: 2024-04-10 | End: 2024-04-10

## 2024-04-10 RX ORDER — LEVOTHYROXINE SODIUM 100 UG/1
50 TABLET ORAL
COMMUNITY

## 2024-04-10 RX ORDER — ONDANSETRON 4 MG/1
4 TABLET, ORALLY DISINTEGRATING ORAL EVERY 6 HOURS PRN
Status: DISCONTINUED | OUTPATIENT
Start: 2024-04-10 | End: 2024-04-12 | Stop reason: HOSPADM

## 2024-04-10 RX ORDER — IPRATROPIUM BROMIDE AND ALBUTEROL SULFATE 2.5; .5 MG/3ML; MG/3ML
3 SOLUTION RESPIRATORY (INHALATION) ONCE
Status: COMPLETED | OUTPATIENT
Start: 2024-04-10 | End: 2024-04-10

## 2024-04-10 RX ORDER — FLUTICASONE FUROATE AND VILANTEROL 100; 25 UG/1; UG/1
1 POWDER RESPIRATORY (INHALATION) DAILY
Status: DISCONTINUED | OUTPATIENT
Start: 2024-04-11 | End: 2024-04-12 | Stop reason: HOSPADM

## 2024-04-10 RX ORDER — ONDANSETRON 2 MG/ML
4 INJECTION INTRAMUSCULAR; INTRAVENOUS EVERY 6 HOURS PRN
Status: DISCONTINUED | OUTPATIENT
Start: 2024-04-10 | End: 2024-04-12 | Stop reason: HOSPADM

## 2024-04-10 RX ORDER — LEVOTHYROXINE SODIUM 50 UG/1
100 TABLET ORAL
Status: DISCONTINUED | OUTPATIENT
Start: 2024-04-12 | End: 2024-04-10

## 2024-04-10 RX ORDER — ALBUTEROL SULFATE 0.83 MG/ML
3 SOLUTION RESPIRATORY (INHALATION) EVERY 4 HOURS PRN
Status: DISCONTINUED | OUTPATIENT
Start: 2024-04-10 | End: 2024-04-11

## 2024-04-10 RX ORDER — CALCIUM CARBONATE 500 MG/1
1000 TABLET, CHEWABLE ORAL 4 TIMES DAILY PRN
Status: DISCONTINUED | OUTPATIENT
Start: 2024-04-10 | End: 2024-04-12 | Stop reason: HOSPADM

## 2024-04-10 RX ORDER — LEVOTHYROXINE SODIUM 50 UG/1
100 TABLET ORAL
Status: DISCONTINUED | OUTPATIENT
Start: 2024-04-11 | End: 2024-04-12 | Stop reason: HOSPADM

## 2024-04-10 RX ORDER — LISINOPRIL 5 MG/1
5 TABLET ORAL DAILY
COMMUNITY
Start: 2024-03-26 | End: 2025-03-26

## 2024-04-10 RX ORDER — MECLIZINE HYDROCHLORIDE 25 MG/1
25 TABLET ORAL 4 TIMES DAILY PRN
Status: DISCONTINUED | OUTPATIENT
Start: 2024-04-10 | End: 2024-04-12 | Stop reason: HOSPADM

## 2024-04-10 RX ORDER — LEVOTHYROXINE SODIUM 50 UG/1
50 TABLET ORAL
Status: DISCONTINUED | OUTPATIENT
Start: 2024-04-12 | End: 2024-04-12 | Stop reason: HOSPADM

## 2024-04-10 RX ORDER — ACETAMINOPHEN 325 MG/1
650 TABLET ORAL EVERY 4 HOURS PRN
Status: DISCONTINUED | OUTPATIENT
Start: 2024-04-10 | End: 2024-04-12 | Stop reason: HOSPADM

## 2024-04-10 RX ADMIN — ACETAMINOPHEN 650 MG: 325 TABLET ORAL at 18:23

## 2024-04-10 RX ADMIN — PREDNISONE 40 MG: 20 TABLET ORAL at 11:40

## 2024-04-10 RX ADMIN — ALBUTEROL SULFATE 5 MG: 2.5 SOLUTION RESPIRATORY (INHALATION) at 13:18

## 2024-04-10 RX ADMIN — LISINOPRIL 5 MG: 5 TABLET ORAL at 21:58

## 2024-04-10 RX ADMIN — METHYLPREDNISOLONE SODIUM SUCCINATE 40 MG: 40 INJECTION, POWDER, FOR SOLUTION INTRAMUSCULAR; INTRAVENOUS at 15:42

## 2024-04-10 RX ADMIN — IPRATROPIUM BROMIDE AND ALBUTEROL SULFATE 3 ML: .5; 3 SOLUTION RESPIRATORY (INHALATION) at 10:34

## 2024-04-10 RX ADMIN — Medication 25 MCG: at 21:57

## 2024-04-10 RX ADMIN — ONDANSETRON 4 MG: 2 INJECTION INTRAMUSCULAR; INTRAVENOUS at 18:24

## 2024-04-10 RX ADMIN — GUAIFENESIN 600 MG: 600 TABLET ORAL at 21:57

## 2024-04-10 RX ADMIN — SODIUM CHLORIDE 500 ML: 9 INJECTION, SOLUTION INTRAVENOUS at 12:50

## 2024-04-10 RX ADMIN — ALBUTEROL SULFATE 2.5 MG: 2.5 SOLUTION RESPIRATORY (INHALATION) at 21:19

## 2024-04-10 ASSESSMENT — ACTIVITIES OF DAILY LIVING (ADL)
ADLS_ACUITY_SCORE: 23
ADLS_ACUITY_SCORE: 35
ADLS_ACUITY_SCORE: 35
ADLS_ACUITY_SCORE: 37
ADLS_ACUITY_SCORE: 23
ADLS_ACUITY_SCORE: 37
ADLS_ACUITY_SCORE: 23
ADLS_ACUITY_SCORE: 37
ADLS_ACUITY_SCORE: 23
ADLS_ACUITY_SCORE: 23

## 2024-04-10 ASSESSMENT — COLUMBIA-SUICIDE SEVERITY RATING SCALE - C-SSRS
2. HAVE YOU ACTUALLY HAD ANY THOUGHTS OF KILLING YOURSELF IN THE PAST MONTH?: NO
6. HAVE YOU EVER DONE ANYTHING, STARTED TO DO ANYTHING, OR PREPARED TO DO ANYTHING TO END YOUR LIFE?: NO
1. IN THE PAST MONTH, HAVE YOU WISHED YOU WERE DEAD OR WISHED YOU COULD GO TO SLEEP AND NOT WAKE UP?: NO

## 2024-04-10 NOTE — PLAN OF CARE
Problem: Adult Inpatient Plan of Care  Goal: Absence of Hospital-Acquired Illness or Injury  Intervention: Identify and Manage Fall Risk  Recent Flowsheet Documentation  Taken 4/10/2024 1800 by Santo Laguna RN  Safety Promotion/Fall Prevention:   safety round/check completed   room near nurse's station   clutter free environment maintained     Problem: Adult Inpatient Plan of Care  Goal: Absence of Hospital-Acquired Illness or Injury  Intervention: Prevent Infection  Recent Flowsheet Documentation  Taken 4/10/2024 1800 by Santo Laguna RN  Infection Prevention:   single patient room provided   hand hygiene promoted   personal protective equipment utilized     Problem: Adult Inpatient Plan of Care  Goal: Optimal Comfort and Wellbeing  Intervention: Monitor Pain and Promote Comfort  Recent Flowsheet Documentation  Taken 4/10/2024 1823 by Santo Laguna RN  Pain Management Interventions: medication (see MAR)   Goal Outcome Evaluation:    Patient alert and oriented x 4, speech clear.   On 1 LPM via NC continuous saturation 92%.  Patient has coarse lung sounds and productive cough with yellow green phleym .  Has Influenza A.  Given Tylenol prn for generalized aches from cough and sore throat.  PIV SL has drainage and flushed.  ROM good denies numbness or tingling.  No edema.  Last BM 4/9/23.  SBA with transfers, ambulation and toileting.  Independent with [facility].   at bedside.  Wears glasses, no hearing impairment, natural teeth.  Droplet precautions for influenza A. Patient lives with  at home.  They care for each other.  Has  biweekly.  Patient is independent with ADL's, mobility and errands at home

## 2024-04-10 NOTE — ED PROVIDER NOTES
EMERGENCY DEPARTMENT ENCOUNTER      NAME: Teetee Tanner  AGE: 84 year old female  YOB: 1940  MRN: [id number]  EVALUATION DATE & TIME: No admission date for patient encounter.    PCP: Jasmyn Tony    ED PROVIDER: Federico Deleon D.O.      Chief Complaint   Patient presents with    Shortness of Breath       FINAL IMPRESSION:  1. Influenza A    2. Asthma with acute exacerbation, unspecified asthma severity, unspecified whether persistent        ED COURSE & MEDICAL DECISION MAKING:    10:12 AM I met with the patient to gather history and to perform my initial exam. I discussed the plan for care while in the Emergency Department.  1:10 PM Called out to triage, patient dropping oxygen saturation. Moved to ED room, called RT to start high flow and suctioning. Plan to admit.  1:42 PM I spoke with the hospitalist, Dr. Caldera. We discussed the patient's case and they agree to admit the patient.          Pertinent Labs & Imaging studies reviewed. (See chart for details)  84 year old female presents to the Emergency Department for evaluation of shortness of breath with wheezing.  Patient has known history of asthma.  Concern was for asthma exacerbation.  Differential however would also include ACS, infectious etiology, PE, pneumothorax, other acute process.  Chest x-ray did not show any evidence of pneumothorax, or pneumonia.  She is not tachycardic, and initially was not hypoxic, clinically did not appear to be  consistent with pulmonary embolism.  She was definitively wheezy on exam, consistent with her known asthma, which did appear to be exacerbated.  She is positive for influenza A, and I do believe this to be the triggering cause of the patient's current asthma exacerbation.  EKG did not show any evidence of ischemia or arrhythmia and her troponin was negative.  Plan was for discharge home, however the patient started having more shortness of breath and became hypoxic in the emergency department,  possible mucous plugging as she was trying to clear secretions.  We were able to get her oxygen saturations up with oxygen and nebs, and patient is more comfortable now, however after this I do believe requires overnight observation due to severe asthma exacerbation.  Discussed with the hospitalist who agreed to the admission.    Medical Decision Making  Obtained supplemental history:Supplemental history obtained?: No  Reviewed external records: External records reviewed?: Documented in chart and Outpatient Record: Family medicine with Dr. Willard  Care impacted by chronic illness:Cancer/Chemotherapy and Hypertension  Care significantly affected by social determinants of health:N/A  Did you consider but not order tests?: Work up considered but not performed and documented in chart, if applicable  Did you interpret images independently?: Independent interpretation of ECG and images noted in documentation, when applicable.  Consultation discussion with other provider:Did you involve another provider (consultant, , pharmacy, etc.)?: I discussed the care with another health care provider, see documentation for details.  Admit.    At the conclusion of the encounter I discussed the results of all of the tests and the disposition. The questions were answered. The patient or family acknowledged understanding and was agreeable with the care plan.        HPI    Patient information was obtained from: Self    Use of : N/A       Teetee Tanner is a 84 year old female who presents with 2-3 weeks of worsening shortness of breath, wheezing, and coughing.. She said her asthma symptoms usually get worse during this season. She also said that she has had an elevated fever of 99. She also reports that she has been taking her Advair and albuterol recently without improvement.     Per Chart Review: Patient had been seen on 4/3/2024 for hypertension in family medicine by Dr. Willard.      REVIEW OF SYSTEMS  Constitutional:   Denies fever, chills, weight loss or weakness  Eyes:  No pain, discharge, redness  HENT:  Denies sore throat, ear pain, congestion  Respiratory: Positive for shortness of breath, wheezing, cough.  Cardiovascular:  No CP, palpitations  GI:  Denies abdominal pain, nausea, vomiting, diarrhea  : Denies dysuria, hematuria  Musculoskeletal:  Denies any new muscle/joint pain, swelling or loss of function.  Skin:  Denies rash, pallor  Neurologic:  Denies headache, focal weakness or sensory changes  Lymph: Denies swollen nodes    All other systems negative unless noted in HPI.    PAST MEDICAL HISTORY:  No past medical history on file.    PAST SURGICAL HISTORY:  Past Surgical History:   Procedure Laterality Date    EXCISE BREAST CYST/FIBROADENOMA/TUMOR/DUCT LESION/NIPPLE LESION/AREOLAR LESION      Description: Breast Surgery Lumpectomy;  Recorded: 02/27/2008;    HC EXCISION NASAL POLYP(S), EXTENSIVE      Description: Extensive Excision Of Nasal Polyps;  Recorded: 02/27/2008;    HC REMOVE TONSILS/ADENOIDS,<11 Y/O      Description: Tonsillectomy With Adenoidectomy;  Recorded: 02/27/2008;    AR VAGINAL HYSTERECTOMY,UTERUS 250 GMS/<      Description: Vaginal Hysterectomy;  Proc Date: 02/25/2005;  Comments: BSO, Bladder repair         CURRENT MEDICATIONS:    No current facility-administered medications for this encounter.     Current Outpatient Medications   Medication Sig Dispense Refill    albuterol (PROAIR HFA/PROVENTIL HFA/VENTOLIN HFA) 108 (90 Base) MCG/ACT inhaler Inhale 2 puffs into the lungs every 4 hours as needed      diclofenac (VOLTAREN) 1 % topical gel Apply to effected area twice daily as needed for discomfort.      LORazepam (ATIVAN) 0.5 MG tablet Take 1 tablet (0.5 mg) by mouth every 6 hours as needed (vertigo) 20 tablet 0    meclizine (ANTIVERT) 25 MG tablet Take 1 tablet (25 mg) by mouth 4 times daily as needed for dizziness 20 tablet 0    SYNTHROID 100 MCG tablet TAKE 1 TABLET BY MOUTH 6 DAYS PER WEEK AND 1/2  "TABLET BY MOUTH ONCE PER WEEK           ALLERGIES:  Allergies   Allergen Reactions    Celery Oil Hives     Other reaction(s): Cutaneous reactions    Statins Other (See Comments)     Pt has mitochondrial disorder. Seen at [place] for this. Told never to take statins      Morphine Unknown and Other (See Comments)     Other reaction(s): \"Makes me hyper\"  Makes me hyper      Mushroom GI Disturbance     Cause vomiting    Oxycodone     Atorvastatin Unknown    Cephalosporins Unknown    Codeine Hives    Diphenhydramine Hcl [Diphenhydramine] Hives    Nitrofurantoin      Other reaction(s): Muscle Aches/Weakness    Other (Do Not Use)      Other reaction(s): Throat Irritation  Mold and dust    Penicillins Hives    Sulfa Antibiotics Hives    Tetracyclines & Related Unknown    Fumaric Acid Rash    Pineapple Other (See Comments)     Difficulty swallowing. Also her mouth gets raw.      Proparacaine Rash    Tomato Other (See Comments)     Raw mouth         FAMILY HISTORY:  No family history on file.    SOCIAL HISTORY:  Social History     Socioeconomic History    Marital status:    Tobacco Use    Smoking status: Never   Substance and Sexual Activity    Alcohol use: No    Drug use: No       VITALS:  Patient Vitals for the past 24 hrs:   BP Temp Temp src Pulse Resp SpO2 Height Weight   04/10/24 1343 (!) 154/72 -- -- 94 17 97 % -- --   04/10/24 1332 -- -- -- -- -- 98 % -- --   04/10/24 1318 -- -- -- -- 16 -- -- --   04/10/24 1034 -- -- -- -- 16 98 % -- --   04/10/24 0941 (!) 153/89 99.2  F (37.3  C) Oral 79 18 96 % 1.626 m (5' 4\") 68 kg (150 lb)       PHYSICAL EXAM    VITAL SIGNS: BP (!) 154/72   Pulse 94   Temp 99.2  F (37.3  C) (Oral)   Resp 17   Ht 1.626 m (5' 4\")   Wt 68 kg (150 lb)   SpO2 97%   BMI 25.75 kg/m      General Appearance: Well-appearing, well-nourished, no acute distress  Head:  Normocephalic, without obvious abnormality, atraumatic  Eyes:  PERRL, conjunctiva/corneas clear, EOM's intact,  ENT:  Lips, mucosa, " and tongue normal, membranes are moist without pallor  Neck:  Normal ROM, symmetrical, trachea midline    Chest:  No tenderness or deformity, no crepitus  Cardio:  Regular rate and rhythm, no murmur, rub or gallop, 2+ pulses symmetric in all extremities  Pulm:  Clear to auscultation bilaterally, respirations unlabored,  Back:  ROM normal, no CVA tenderness, no spinal tenderness, no paraspinal tenderness  Abdomen:  Soft, non-tender, no rebound or guarding.  Musculoskeletal: Full ROM, no edema, no cyanosis, good ROM of major joints  Integument:  Warm, Dry, No erythema, No rash.    Neurologic:  Alert & oriented.  No focal deficits appreciated.  Ambulatory.  Psychiatric:  Affect normal, Judgment normal, Mood normal.      LABS  Results for orders placed or performed during the hospital encounter of 04/10/24 (from the past 24 hour(s))   Symptomatic Influenza A/B, RSV, & SARS-CoV2 PCR (COVID-19) Nasopharyngeal    Specimen: Nasopharyngeal; Swab   Result Value Ref Range    Influenza A PCR Positive (A) Negative    Influenza B PCR Negative Negative    RSV PCR Negative Negative    SARS CoV2 PCR Negative Negative    Narrative    Testing was performed using the Xpert Xpress CoV2/Flu/RSV Assay on the [employer] GeneXpert Instrument. This test should be ordered for the detection of SARS-CoV-2, influenza, and RSV viruses in individuals who meet clinical and/or epidemiological criteria. Test performance is unknown in asymptomatic patients. This test is for in vitro diagnostic use under the FDA EUA for laboratories certified under CLIA to perform high or moderate complexity testing. This test has not been FDA cleared or approved. A negative result does not rule out the presence of PCR inhibitors in the specimen or target RNA in concentration below the limit of detection for the assay. If only one viral target is positive but coinfection with multiple targets is suspected, the sample should be re-tested with another FDA cleared, approved,  or authorized test, if coinfection would change clinical management. This test was validated by the [facility]. These laboratories are certified under the Clinical Laboratory Improvement Amendments of 1988 (CLIA-88) as qualified to perform high complexity laboratory testing.   CBC with platelets differential    Narrative    The following orders were created for panel order CBC with platelets differential.  Procedure                               Abnormality         Status                     ---------                               -----------         ------                     CBC with platelets and d...[[id number]]  Abnormal            Final result                 Please view results for these tests on the individual orders.   Basic metabolic panel   Result Value Ref Range    Sodium 129 (L) 135 - 145 mmol/L    Potassium 4.3 3.4 - 5.3 mmol/L    Chloride 94 (L) 98 - 107 mmol/L    Carbon Dioxide (CO2) 26 22 - 29 mmol/L    Anion Gap 9 7 - 15 mmol/L    Urea Nitrogen 7.3 (L) 8.0 - 23.0 mg/dL    Creatinine 0.57 0.51 - 0.95 mg/dL    GFR Estimate 90 >60 mL/min/1.73m2    Calcium 8.8 8.8 - 10.2 mg/dL    Glucose 94 70 - 99 mg/dL   Troponin T, High Sensitivity   Result Value Ref Range    Troponin T, High Sensitivity 16 (H) <=14 ng/L   CBC with platelets and differential   Result Value Ref Range    WBC Count 3.0 (L) 4.0 - 11.0 10e3/uL    RBC Count 4.42 3.80 - 5.20 10e6/uL    Hemoglobin 13.5 11.7 - 15.7 g/dL    Hematocrit 40.4 35.0 - 47.0 %    MCV 91 78 - 100 fL    MCH 30.5 26.5 - 33.0 pg    MCHC 33.4 31.5 - 36.5 g/dL    RDW 15.2 (H) 10.0 - 15.0 %    Platelet Count 158 150 - 450 10e3/uL    % Neutrophils 57 %    % Lymphocytes 18 %    % Monocytes 23 %    % Eosinophils 1 %    % Basophils 2 %    % Immature Granulocytes 0 %    NRBCs per 100 WBC 0 <1 /100    Absolute Neutrophils 1.7 1.6 - 8.3 10e3/uL    Absolute Lymphocytes 0.5 (L) 0.8 - 5.3 10e3/uL    Absolute Monocytes 0.7 0.0 - 1.3 10e3/uL    Absolute Eosinophils  0.0 0.0 - 0.7 10e3/uL    Absolute Basophils 0.1 0.0 - 0.2 10e3/uL    Absolute Immature Granulocytes 0.0 <=0.4 10e3/uL    Absolute NRBCs 0.0 10e3/uL   XR Chest Port 1 View    Narrative    EXAM: XR CHEST PORT 1 VIEW  LOCATION: [facility]  DATE: 4/10/2024    INDICATION: PMHx of asthma, worsening dyspnea, cough, wheezing.  COMPARISON: 02/22/2023      Impression    IMPRESSION: Negative chest.   Troponin T, High Sensitivity   Result Value Ref Range    Troponin T, High Sensitivity 15 (H) <=14 ng/L         RADIOLOGY  XR Chest Port 1 View   Final Result   IMPRESSION: Negative chest.             EKG:    Rate: 74 bpm  Rhythm: Normal Sinus Rhythm  Axis: Left  Interval: Normal  Conduction: Normal  QRS: Normal  ST: Normal  T-wave: Normal  QT: Not prolonged  Comparison EKG: no significant change comapred to 5 July 2022  Impression:  No acute ischemic change   I have independently reviewed and interpreted today's EKG, pending Cardiologist read      MEDICATIONS GIVEN IN THE EMERGENCY:  Medications   ipratropium - albuterol 0.5 mg/2.5 mg/3 mL (DUONEB) neb solution 3 mL (3 mLs Nebulization $Given 4/10/24 1034)   predniSONE (DELTASONE) tablet 40 mg (40 mg Oral $Given 4/10/24 1140)   sodium chloride 0.9% BOLUS 500 mL (0 mLs Intravenous Stopped 4/10/24 1347)   albuterol (PROVENTIL) neb solution 5 mg (5 mg Nebulization $Given 4/10/24 1318)       NEW PRESCRIPTIONS STARTED AT TODAY'S ER VISIT  New Prescriptions    No medications on file        I, Michaela Gilliland, am serving as a scribe to document services personally performed by Federico Deleon D.O., based on my observations and the provider's statements to me.  I, Federico Deleon D.O., attest that Michaela Gilliland is acting in a scribe capacity, has observed my performance of the services and has documented them in accordance with my direction.     Federico Deleon D.O.  Emergency Medicine  [facility] EMERGENCY ROOM  [address]  [address]  [place] [address]  [phone]  Dept: [phone]       Federico Deleon,   04/10/24 1400

## 2024-04-10 NOTE — ED PROVIDER NOTES
EMERGENCY DEPARTMENT ENCOUNTER      NAME: Teetee Tanner  AGE: 84 year old female  YOB: 1940  MRN: [id number]  EVALUATION DATE & TIME: 4/10/24    PCP: Jasmyn Tony    ED PROVIDER: Riccardo Bennett MD; Federico Deleon DO       Chief Complaint   Patient presents with    Shortness of Breath         FINAL IMPRESSION:  1. Influenza A    2. Asthma with acute exacerbation, unspecified asthma severity, unspecified whether persistent      ED COURSE & MEDICAL DECISION MAKING:    Pertinent Labs & Imaging studies reviewed. (See chart for details)  84 year old female presents to the Emergency Department for evaluation of 2-3 weeks of cough, dyspnea, and wheezing concerning for asthma exacerbation with no relief of PTA medications. Positive for influenza A, suspect likely contributor/culprit of asthma exacerbation. Outside window of treatment with Tamiflu. Will try duonebs and 1 time dose of steroids in ED. Initial trop elevated as well; will repeat.   12:15pm: updated patient about positive Flu A results; patient reports improvement after DuoNebs and one time dose of oral prednisone 40mg.  Will recheck troponin and give 500cc NS bolus. Delta trop normal.   1:10pm: was alerted that patient had acute coughing and desaturation out in the waiting room. Was urgently placed on supplemental O2 and transferred to room. Patient continued to cough with improvement of dyspnea but stated that she felt something was stuck in her throat. Says that she was sitting in the lobby when she began coughing, drank water and then couldn't breath. History suggests possible aspiration event vs mucous plugging. Lungs overall decently clear with mild wheezing. Ordered another 5mg albuterol nebulizer treatment.   1:25pm: patient receiving nebulizer treatment; endorses breathing has improved but still some dyspnea. Still requiring supplemental O2. Given sudden desaturations and ongoing O2 needs, and dyspnea, will admit patient for ongoing  treatment.   1:44PM: Spoke with Hospitalist, Dr. Michel Caldera, who accepted patient for admission.        At the conclusion of the encounter I discussed the results of all of the tests and the disposition. The questions were answered. The patient or family acknowledged understanding and was agreeable with the care plan.     Patient seen and discussed with Dr. Federico Bennett MD  PGY-1   [facility] EMERGENCY ROOM  [facility] [address] [phone]    MEDICATIONS GIVEN IN THE EMERGENCY:  Medications   ipratropium - albuterol 0.5 mg/2.5 mg/3 mL (DUONEB) neb solution 3 mL (3 mLs Nebulization $Given 4/10/24 1034)   predniSONE (DELTASONE) tablet 40 mg (40 mg Oral $Given 4/10/24 1140)   sodium chloride 0.9% BOLUS 500 mL (0 mLs Intravenous Stopped 4/10/24 1347)   albuterol (PROVENTIL) neb solution 5 mg (5 mg Nebulization $Given 4/10/24 1318)       NEW PRESCRIPTIONS STARTED AT TODAY'S ER VISIT  New Prescriptions    No medications on file          =================================================================    HPI    Teetee Tanner is a 84 year old female with a pertinent history of asthma, hypothyroidism, myopathy, HLPD, basal cell carcinoma s/p Mohs surgery who presents to this ED [facility] for evaluation of increased shortness of breath, wheezing, cough concerning for asthma exacerbation. Patient notes worsening asthma symptoms for the past 2-3 weeks now with the season change. Says this season has particularly been bad for her. She's been using her Advair BID, nebulizer, and rescue inhalers without relief of symptoms. Last night, wheezing, cough and dyspnea were particularly bad which prompted her to come to ED today for evaluation. She denies any fevers or chills. Does endorse some chest soreness from coughing.     REVIEW OF SYSTEMS   Review of Systems   Negative unless stated in HPI     PAST MEDICAL HISTORY:  No past medical history on  "file.    PAST SURGICAL HISTORY:  Past Surgical History:   Procedure Laterality Date    EXCISE BREAST CYST/FIBROADENOMA/TUMOR/DUCT LESION/NIPPLE LESION/AREOLAR LESION      Description: Breast Surgery Lumpectomy;  Recorded: 02/27/2008;    HC EXCISION NASAL POLYP(S), EXTENSIVE      Description: Extensive Excision Of Nasal Polyps;  Recorded: 02/27/2008;    HC REMOVE TONSILS/ADENOIDS,<13 Y/O      Description: Tonsillectomy With Adenoidectomy;  Recorded: 02/27/2008;    [place] VAGINAL HYSTERECTOMY,UTERUS 250 GMS/<      Description: Vaginal Hysterectomy;  Proc Date: 02/25/2005;  Comments: BSO, Bladder repair           CURRENT MEDICATIONS:    albuterol (PROAIR HFA/PROVENTIL HFA/VENTOLIN HFA) 108 (90 Base) MCG/ACT inhaler  diclofenac (VOLTAREN) 1 % topical gel  LORazepam (ATIVAN) 0.5 MG tablet  meclizine (ANTIVERT) 25 MG tablet  SYNTHROID 100 MCG tablet        ALLERGIES:  Allergies   Allergen Reactions    Celery Oil Hives     Other reaction(s): Cutaneous reactions    Statins Other (See Comments)     Pt has mitochondrial disorder. Seen at [place] for this. Told never to take statins      Morphine Unknown and Other (See Comments)     Other reaction(s): \"Makes me hyper\"  Makes me hyper      Mushroom GI Disturbance     Cause vomiting    Oxycodone     Atorvastatin Unknown    Cephalosporins Unknown    Codeine Hives    Diphenhydramine Hcl [Diphenhydramine] Hives    Nitrofurantoin      Other reaction(s): Muscle Aches/Weakness    Other (Do Not Use)      Other reaction(s): Throat Irritation  Mold and dust    Penicillins Hives    Sulfa Antibiotics Hives    Tetracyclines & Related Unknown    Fumaric Acid Rash    Pineapple Other (See Comments)     Difficulty swallowing. Also her mouth gets raw.      Proparacaine Rash    Tomato Other (See Comments)     Raw mouth         FAMILY HISTORY:  No family history on file.    SOCIAL HISTORY:   Social History     Socioeconomic History    Marital status:    Tobacco Use    Smoking status: Never " "  Substance and Sexual Activity    Alcohol use: No    Drug use: No       VITALS:  BP (!) 154/72   Pulse 94   Temp 99.2  F (37.3  C) (Oral)   Resp 17   Ht 1.626 m (5' 4\")   Wt 68 kg (150 lb)   SpO2 97%   BMI 25.75 kg/m      PHYSICAL EXAM    Constitutional: Well developed, Well nourished, NAD  HENT: oral mucosa moist.   NECK: no cervical LAD    Eyes: PERRL, EOMI, Conjunctiva normal, No discharge.   Respiratory: mild diffuse wheezing. Coughing throughout exam.  No use of accessory muscles.   Cardiovascular: Normal heart rate, Regular rhythm.    GI: soft, nontender  Neurologic: Alert & oriented x 3     LAB:  All pertinent labs reviewed and interpreted.  Results for orders placed or performed during the hospital encounter of 04/10/24   XR Chest Port 1 View    Impression    IMPRESSION: Negative chest.   Symptomatic Influenza A/B, RSV, & SARS-CoV2 PCR (COVID-19) Nasopharyngeal    Specimen: Nasopharyngeal; Swab   Result Value Ref Range    Influenza A PCR Positive (A) Negative    Influenza B PCR Negative Negative    RSV PCR Negative Negative    SARS CoV2 PCR Negative Negative   Basic metabolic panel   Result Value Ref Range    Sodium 129 (L) 135 - 145 mmol/L    Potassium 4.3 3.4 - 5.3 mmol/L    Chloride 94 (L) 98 - 107 mmol/L    Carbon Dioxide (CO2) 26 22 - 29 mmol/L    Anion Gap 9 7 - 15 mmol/L    Urea Nitrogen 7.3 (L) 8.0 - 23.0 mg/dL    Creatinine 0.57 0.51 - 0.95 mg/dL    GFR Estimate 90 >60 mL/min/1.73m2    Calcium 8.8 8.8 - 10.2 mg/dL    Glucose 94 70 - 99 mg/dL   Result Value Ref Range    Troponin T, High Sensitivity 16 (H) <=14 ng/L   CBC with platelets and differential   Result Value Ref Range    WBC Count 3.0 (L) 4.0 - 11.0 10e3/uL    RBC Count 4.42 3.80 - 5.20 10e6/uL    Hemoglobin 13.5 11.7 - 15.7 g/dL    Hematocrit 40.4 35.0 - 47.0 %    MCV 91 78 - 100 fL    MCH 30.5 26.5 - 33.0 pg    MCHC 33.4 31.5 - 36.5 g/dL    RDW 15.2 (H) 10.0 - 15.0 %    Platelet Count 158 150 - 450 10e3/uL    % Neutrophils 57 %    " % Lymphocytes 18 %    % Monocytes 23 %    % Eosinophils 1 %    % Basophils 2 %    % Immature Granulocytes 0 %    NRBCs per 100 WBC 0 <1 /100    Absolute Neutrophils 1.7 1.6 - 8.3 10e3/uL    Absolute Lymphocytes 0.5 (L) 0.8 - 5.3 10e3/uL    Absolute Monocytes 0.7 0.0 - 1.3 10e3/uL    Absolute Eosinophils 0.0 0.0 - 0.7 10e3/uL    Absolute Basophils 0.1 0.0 - 0.2 10e3/uL    Absolute Immature Granulocytes 0.0 <=0.4 10e3/uL    Absolute NRBCs 0.0 10e3/uL   Result Value Ref Range    Troponin T, High Sensitivity 15 (H) <=14 ng/L       RADIOLOGY:  Reviewed all pertinent imaging. Please see official radiology report.  XR Chest Port 1 View   Final Result   IMPRESSION: Negative chest.          EKG:    Performed at: 10:22    Impression: NSR; no significant change from prior     Rate: 74  Rhythm: sinus  Axis: left  IA Interval: 178  QRS Interval: 98  QTc Interval: 400  ST Changes: none  Comparison: 6/5/22    I have independently reviewed and interpreted the EKG(s) documented above.             Riccardo Bennett MD  Resident  04/10/24 5264

## 2024-04-10 NOTE — MEDICATION SCRIBE - ADMISSION MEDICATION HISTORY
Medication Scribe Admission Medication History    Admission medication history is complete. The information provided in this note is only as accurate as the sources available at the time of the update.    Information Source(s): Patient and CareEverywhere/SureScripts via in-person    Pertinent Information: Patient has albuterol inhaler with her and will have  bring in the Advair inhaler later this evening. 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 Reports that she will bring in her own wound care supplies.     Changes made to PTA medication list:  Added:   Advair 115/21 mcg/act  Lisinopril 5 mg  Vit D 1,000 international unit(s)   Ubiquitin 400 mg   Deleted:   Diclofenac 1% gel  Changed:   Levothyroxine 100 mcg (Synthroid brand only): was taking one half tablet once weekly, but is currently taking one half tablet twice weekly on Tuesday and Friday. Taking a full tablet all other days.     Allergies reviewed with patient and updates made in EHR: yes    Medication History Completed By: Tommie Escalera 4/10/2024 4:53 PM    PTA Med List   Medication Sig Last Dose    albuterol (PROAIR HFA/PROVENTIL HFA/VENTOLIN HFA) 108 (90 Base) MCG/ACT inhaler Inhale 2 puffs into the lungs every 4 hours as needed 4/10/2024 at AM; PTA, has with, do not dispense    Coenzyme Q10 400 MG CAPS Take 1 capsule by mouth daily In the morning 4/10/2024 at AM    fluticasone-salmeterol (ADVAIR HFA) 115-21 MCG/ACT inhaler Inhale 2 puffs into the lungs 2 times daily Rinse mouth after use to prevent oral thrush. 4/10/2024 at AM; will bring inhaler later do not dispense    levothyroxine (SYNTHROID) 100 MCG tablet Take 50 mcg by mouth twice a week Take 0.5 tablet (50 mcg) by mouth twice weekly on Tu/F. Please dispense Synthroid only. 4/9/2024 at AM; 0.5 tablet    lisinopril (ZESTRIL) 5 MG tablet Take 5 mg by mouth daily In the morning 4/10/2024 at AM    LORazepam (ATIVAN) 0.5 MG tablet Take 1 tablet (0.5 mg) by  mouth every 6 hours as needed (vertigo) More than a month    meclizine (ANTIVERT) 25 MG tablet Take 1 tablet (25 mg) by mouth 4 times daily as needed for dizziness More than a month    SYNTHROID 100 MCG tablet Take 100 mcg by mouth five times a week Take one tablet by mouth on M/W/Th/Sa/Su. Please dispense Synthroid only. 4/10/2024 at AM; full tablet    Vitamin D3 (CHOLECALCIFEROL) 25 mcg (1000 units) tablet Take 25 mcg by mouth daily 4/10/2024 at AM

## 2024-04-10 NOTE — ED TRIAGE NOTES
"BP (!) 153/89   Pulse 79   Temp 99.2  F (37.3  C) (Oral)   Resp 18   Ht 1.626 m (5' 4\")   Wt 68 kg (150 lb)   SpO2 96%   BMI 25.75 kg/m      Pt here with worsening SOB x a few days. She has a hx of asthma and her inhalers have not been improving her symptoms. She also has congestion and a cough.      Triage Assessment (Adult)       Row Name 04/10/24 0942          Triage Assessment    Airway WDL WDL        Respiratory WDL    Respiratory WDL X;rhythm/pattern;cough     Rhythm/Pattern, Respiratory shortness of breath     Cough Frequency frequent     Cough Type bronchospastic;congested        Skin Circulation/Temperature WDL    Skin Circulation/Temperature WDL WDL        Cardiac WDL    Cardiac WDL X;chest pain        Chest Pain Assessment    Chest Pain Location anterior chest, right;anterior chest, left     Precipitating Factors other (see comments)  Coughing        Peripheral/Neurovascular WDL    Peripheral Neurovascular WDL WDL        Cognitive/Neuro/Behavioral WDL    Cognitive/Neuro/Behavioral WDL WDL                     "

## 2024-04-10 NOTE — H&P
"[facility]    History and Physical - Hospitalist Service       Date of Admission:  4/10/2024    Assessment & Plan      Teetee Tanner is a 84 year old female admitted on 4/10/2024. She has a hx of asthma, GERD, hypothyroidism, HLD presents with acute asthma exacerbation from recent influenza A infection reported ~2-3 weeks ago.     Acute Asthma Exacerbation  Acute Hypoxic Respiratory Failure  -CXR/CT demonstrated no consolidations.  -Utilize IV steroids and then transition to PO once clinically improved.  -Duonebs ordered scheduled initially and then transition/wean as per RT as per protocol pending clinical improvement.  -Antibiotics: not indicated  -Oxygen O2 supplementation to maintain O2 >90%. Wean O2 as tolerated.  -RCAT as needed.    GERD  -Order home antacids/medications.     HLD  -Order home statin.    Hypothyroidism  -Order home thyroid replacement/levothyroxine.           Diet:  Regular  DVT Prophylaxis: Low Risk/Ambulatory with no VTE prophylaxis indicated, Pneumatic Compression Devices, Anti-embolisim stockings (TEDs), and Ambulate every shift  Beatty Catheter: Not present  Lines: None     Cardiac Monitoring: None  Code Status:  Full Code    Clinically Significant Risk Factors Present on Admission         # Hyponatremia: Lowest Na = 129 mmol/L in last 2 days, will monitor as appropriate               # Overweight: Estimated body mass index is 25.75 kg/m  as calculated from the following:    Height as of this encounter: 1.626 m (5' 4\").    Weight as of this encounter: 68 kg (150 lb).       # Asthma: noted on problem list        Disposition Plan     Medically Ready for Discharge: Anticipated Tomorrow or next day           Michel Caldera MD  Hospitalist Service  [facility]  Securely message with [employer] (more info)  Text page via [facility] Paging/Directory     ______________________________________________________________________    Chief Complaint "   Shortness of breath    History is obtained from the patient    History of Present Illness   Teetee Tanner is a 84 year old female admitted on 4/10/2024. She has a hx of asthma, GERD, hypothyroidism, HLD presents with acute asthma exacerbation from recent influenza A infection reported ~2-3 weeks ago.     She had been in her usual state of health until 1-week ago when she developed shortness of breath, productive cough, and dyspnea that have been progressive. She has had been to multiple open houses and also to her grandson's basketball games but no specific family/friends who were sick. She endorses subjective fever (measured < 101 F), and generalized weakness and malaise. No other associated symptoms. Otherwise, there is no endorsement of any fevers, rigors, chest pain, nausea or vomiting or abdominal pain, changes in bowel movements/pattern, urinary symptoms, focal weakness, or any other new and associated symptoms at this time. 14 point review of systems performed without any other pertinent positives at this time.     All questions the patient had at this time were answered to verbalized and stated satisfaction and understanding. Code status was discussed and the patient confirmed wishes for full code for this hospitalization.         Past Medical History    No past medical history on file.    Past Surgical History   Past Surgical History:   Procedure Laterality Date    EXCISE BREAST CYST/FIBROADENOMA/TUMOR/DUCT LESION/NIPPLE LESION/AREOLAR LESION      Description: Breast Surgery Lumpectomy;  Recorded: 02/27/2008;    HC EXCISION NASAL POLYP(S), EXTENSIVE      Description: Extensive Excision Of Nasal Polyps;  Recorded: 02/27/2008;    HC REMOVE TONSILS/ADENOIDS,<13 Y/O      Description: Tonsillectomy With Adenoidectomy;  Recorded: 02/27/2008;    [place] VAGINAL HYSTERECTOMY,UTERUS 250 GMS/<      Description: Vaginal Hysterectomy;  Proc Date: 02/25/2005;  Comments: BSO, Bladder repair       Prior to Admission  Medications   Prior to Admission Medications   Prescriptions Last Dose Informant Patient Reported? Taking?   LORazepam (ATIVAN) 0.5 MG tablet   No No   Sig: Take 1 tablet (0.5 mg) by mouth every 6 hours as needed (vertigo)   SYNTHROID 100 MCG tablet   Yes No   Sig: TAKE 1 TABLET BY MOUTH 6 DAYS PER WEEK AND 1/2 TABLET BY MOUTH ONCE PER WEEK   albuterol (PROAIR HFA/PROVENTIL HFA/VENTOLIN HFA) 108 (90 Base) MCG/ACT inhaler   Yes No   Sig: Inhale 2 puffs into the lungs every 4 hours as needed   diclofenac (VOLTAREN) 1 % topical gel   Yes No   Sig: Apply to effected area twice daily as needed for discomfort.   meclizine (ANTIVERT) 25 MG tablet   No No   Sig: Take 1 tablet (25 mg) by mouth 4 times daily as needed for dizziness      Facility-Administered Medications: None           Physical Exam   Vital Signs: Temp: 99.2  F (37.3  C) Temp src: Oral BP: (!) 154/72 Pulse: 94   Resp: 17 SpO2: 97 % O2 Device: Oxymask Oxygen Delivery: 2 LPM  Weight: 150 lbs 0 oz    GENERAL:  Alert, appears comfortable, in no acute distress, appears stated age on Oxymask at 2 L   HEAD:  Normocephalic, without obvious abnormality, atraumatic   EYES:  PERRL, conjunctiva/corneas clear, no scleral icterus, EOM's intact   NOSE: Nares normal, septum midline, mucosa normal, no drainage   THROAT: Lips, mucosa, and tongue normal; teeth and gums normal, mouth moist   NECK: Supple, symmetrical, trachea midline   BACK:   Symmetric, no curvature, ROM normal   LUNGS:   Coarse b/l breath sounds but no significant wheezing (s/p nebulizer and steroids), no rales; symmetric chest rise on inhalation, respirations unlabored   CHEST WALL:  No tenderness or deformity   HEART:  Regular rate and rhythm, S1 and S2 normal, no murmur, rub, or gallop    ABDOMEN:   Soft, non-tender, bowel sounds active all four quadrants, no masses, no organomegaly, no rebound or guarding   EXTREMITIES: Extremities normal, atraumatic, no cyanosis or edema    SKIN: Dry to touch, no  exanthems in the visualized areas   NEURO: Alert, oriented x 4, moves all four extremities freely/spontaneously   PSYCH: Cooperative, behavior is appropriate      Medical Decision Making       77 MINUTES SPENT BY ME on the date of service doing chart review, history, exam, documentation & further activities per the note.      Data     I have personally reviewed the following data over the past 24 hrs:    3.0 (L)  \   13.5   / 158     129 (L) 94 (L) 7.3 (L) /  94   4.3 26 0.57 \     Trop: 15 (H) BNP: N/A       Imaging results reviewed over the past 24 hrs:   Recent Results (from the past 24 hour(s))   XR Chest Port 1 View    Narrative    EXAM: XR CHEST PORT 1 VIEW  LOCATION: [facility]  DATE: 4/10/2024    INDICATION: PMHx of asthma, worsening dyspnea, cough, wheezing.  COMPARISON: 02/22/2023      Impression    IMPRESSION: Negative chest.

## 2024-04-11 LAB
ANION GAP SERPL CALCULATED.3IONS-SCNC: 6 MMOL/L (ref 7–15)
BASOPHILS # BLD AUTO: 0 10E3/UL (ref 0–0.2)
BASOPHILS NFR BLD AUTO: 0 %
BUN SERPL-MCNC: 9.4 MG/DL (ref 8–23)
CALCIUM SERPL-MCNC: 9.2 MG/DL (ref 8.8–10.2)
CHLORIDE SERPL-SCNC: 93 MMOL/L (ref 98–107)
CREAT SERPL-MCNC: 0.52 MG/DL (ref 0.51–0.95)
CREAT SERPL-MCNC: 0.53 MG/DL (ref 0.51–0.95)
DEPRECATED HCO3 PLAS-SCNC: 29 MMOL/L (ref 22–29)
EGFRCR SERPLBLD CKD-EPI 2021: >90 ML/MIN/1.73M2
EGFRCR SERPLBLD CKD-EPI 2021: >90 ML/MIN/1.73M2
EOSINOPHIL # BLD AUTO: 0 10E3/UL (ref 0–0.7)
EOSINOPHIL NFR BLD AUTO: 0 %
ERYTHROCYTE [DISTWIDTH] IN BLOOD BY AUTOMATED COUNT: 14.7 % (ref 10–15)
GLUCOSE SERPL-MCNC: 159 MG/DL (ref 70–99)
HCT VFR BLD AUTO: 36.7 % (ref 35–47)
HGB BLD-MCNC: 12.4 G/DL (ref 11.7–15.7)
IMM GRANULOCYTES # BLD: 0 10E3/UL
IMM GRANULOCYTES NFR BLD: 0 %
LYMPHOCYTES # BLD AUTO: 0.2 10E3/UL (ref 0.8–5.3)
LYMPHOCYTES NFR BLD AUTO: 12 %
MCH RBC QN AUTO: 30.2 PG (ref 26.5–33)
MCHC RBC AUTO-ENTMCNC: 33.8 G/DL (ref 31.5–36.5)
MCV RBC AUTO: 89 FL (ref 78–100)
MONOCYTES # BLD AUTO: 0.2 10E3/UL (ref 0–1.3)
MONOCYTES NFR BLD AUTO: 12 %
NEUTROPHILS # BLD AUTO: 1.4 10E3/UL (ref 1.6–8.3)
NEUTROPHILS NFR BLD AUTO: 77 %
NRBC # BLD AUTO: 0 10E3/UL
NRBC BLD AUTO-RTO: 0 /100
OSMOLALITY SERPL: 270 MMOL/KG (ref 280–301)
OSMOLALITY UR: 138 MMOL/KG (ref 100–1200)
PLATELET # BLD AUTO: 167 10E3/UL (ref 150–450)
POTASSIUM SERPL-SCNC: 4.1 MMOL/L (ref 3.4–5.3)
RBC # BLD AUTO: 4.11 10E6/UL (ref 3.8–5.2)
SODIUM SERPL-SCNC: 128 MMOL/L (ref 135–145)
SODIUM UR-SCNC: <20 MMOL/L
WBC # BLD AUTO: 1.8 10E3/UL (ref 4–11)

## 2024-04-11 PROCEDURE — 250N000011 HC RX IP 250 OP 636: Performed by: HOSPITALIST

## 2024-04-11 PROCEDURE — 82565 ASSAY OF CREATININE: CPT | Performed by: HOSPITALIST

## 2024-04-11 PROCEDURE — 36415 COLL VENOUS BLD VENIPUNCTURE: CPT | Performed by: HOSPITALIST

## 2024-04-11 PROCEDURE — 83935 ASSAY OF URINE OSMOLALITY: CPT | Performed by: HOSPITALIST

## 2024-04-11 PROCEDURE — 250N000009 HC RX 250: Performed by: HOSPITALIST

## 2024-04-11 PROCEDURE — 96372 THER/PROPH/DIAG INJ SC/IM: CPT | Performed by: HOSPITALIST

## 2024-04-11 PROCEDURE — 94640 AIRWAY INHALATION TREATMENT: CPT

## 2024-04-11 PROCEDURE — 999N000157 HC STATISTIC RCP TIME EA 10 MIN

## 2024-04-11 PROCEDURE — 250N000013 HC RX MED GY IP 250 OP 250 PS 637: Performed by: HOSPITALIST

## 2024-04-11 PROCEDURE — 84300 ASSAY OF URINE SODIUM: CPT | Performed by: HOSPITALIST

## 2024-04-11 PROCEDURE — 83930 ASSAY OF BLOOD OSMOLALITY: CPT | Performed by: HOSPITALIST

## 2024-04-11 PROCEDURE — 999N000126 HC STATISTIC PEAK FLOW MEASUREMENT

## 2024-04-11 PROCEDURE — 80048 BASIC METABOLIC PNL TOTAL CA: CPT | Performed by: HOSPITALIST

## 2024-04-11 PROCEDURE — G0378 HOSPITAL OBSERVATION PER HR: HCPCS

## 2024-04-11 PROCEDURE — 85025 COMPLETE CBC W/AUTO DIFF WBC: CPT | Performed by: HOSPITALIST

## 2024-04-11 PROCEDURE — 99232 SBSQ HOSP IP/OBS MODERATE 35: CPT | Performed by: HOSPITALIST

## 2024-04-11 PROCEDURE — 96376 TX/PRO/DX INJ SAME DRUG ADON: CPT

## 2024-04-11 PROCEDURE — 94640 AIRWAY INHALATION TREATMENT: CPT | Mod: 76

## 2024-04-11 PROCEDURE — 250N000009 HC RX 250: Performed by: STUDENT IN AN ORGANIZED HEALTH CARE EDUCATION/TRAINING PROGRAM

## 2024-04-11 RX ORDER — ALBUTEROL SULFATE 0.83 MG/ML
3 SOLUTION RESPIRATORY (INHALATION)
Status: DISCONTINUED | OUTPATIENT
Start: 2024-04-11 | End: 2024-04-12 | Stop reason: HOSPADM

## 2024-04-11 RX ORDER — ENOXAPARIN SODIUM 100 MG/ML
40 INJECTION SUBCUTANEOUS EVERY 24 HOURS
Status: DISCONTINUED | OUTPATIENT
Start: 2024-04-11 | End: 2024-04-12 | Stop reason: HOSPADM

## 2024-04-11 RX ADMIN — Medication 25 MCG: at 09:01

## 2024-04-11 RX ADMIN — BENZONATATE 100 MG: 100 CAPSULE ORAL at 18:01

## 2024-04-11 RX ADMIN — ALBUTEROL SULFATE 2.5 MG: 2.5 SOLUTION RESPIRATORY (INHALATION) at 15:14

## 2024-04-11 RX ADMIN — METHYLPREDNISOLONE SODIUM SUCCINATE 40 MG: 40 INJECTION, POWDER, FOR SOLUTION INTRAMUSCULAR; INTRAVENOUS at 15:08

## 2024-04-11 RX ADMIN — BENZONATATE 100 MG: 100 CAPSULE ORAL at 01:45

## 2024-04-11 RX ADMIN — ALBUTEROL SULFATE 2.5 MG: 2.5 SOLUTION RESPIRATORY (INHALATION) at 12:16

## 2024-04-11 RX ADMIN — GUAIFENESIN 600 MG: 600 TABLET ORAL at 09:01

## 2024-04-11 RX ADMIN — FLUTICASONE FUROATE AND VILANTEROL TRIFENATATE 1 PUFF: 100; 25 POWDER RESPIRATORY (INHALATION) at 09:02

## 2024-04-11 RX ADMIN — LEVOTHYROXINE SODIUM 100 MCG: 0.05 TABLET ORAL at 06:49

## 2024-04-11 RX ADMIN — METHYLPREDNISOLONE SODIUM SUCCINATE 40 MG: 40 INJECTION, POWDER, FOR SOLUTION INTRAMUSCULAR; INTRAVENOUS at 00:23

## 2024-04-11 RX ADMIN — LISINOPRIL 5 MG: 5 TABLET ORAL at 09:01

## 2024-04-11 RX ADMIN — ENOXAPARIN SODIUM 40 MG: 100 INJECTION SUBCUTANEOUS at 09:01

## 2024-04-11 RX ADMIN — BENZONATATE 100 MG: 100 CAPSULE ORAL at 12:04

## 2024-04-11 RX ADMIN — POLYETHYLENE GLYCOL 3350 17 G: 17 POWDER, FOR SOLUTION ORAL at 18:01

## 2024-04-11 RX ADMIN — METHYLPREDNISOLONE SODIUM SUCCINATE 40 MG: 40 INJECTION, POWDER, FOR SOLUTION INTRAMUSCULAR; INTRAVENOUS at 09:01

## 2024-04-11 RX ADMIN — ALBUTEROL SULFATE 2.5 MG: 2.5 SOLUTION RESPIRATORY (INHALATION) at 03:40

## 2024-04-11 RX ADMIN — GUAIFENESIN 600 MG: 600 TABLET ORAL at 20:25

## 2024-04-11 RX ADMIN — ALBUTEROL SULFATE 2.5 MG: 2.5 SOLUTION RESPIRATORY (INHALATION) at 20:28

## 2024-04-11 RX ADMIN — ACETAMINOPHEN 650 MG: 325 TABLET ORAL at 20:49

## 2024-04-11 ASSESSMENT — ACTIVITIES OF DAILY LIVING (ADL)
ADLS_ACUITY_SCORE: 23

## 2024-04-11 NOTE — CONSULTS
Care Management Initial Consult    General Information  Assessment completed with: Patient,    Type of CM/SW Visit: Initial Assessment    Primary Care Provider verified and updated as needed:     Readmission within the last 30 days:        Reason for Consult: discharge planning  Advance Care Planning:            Communication Assessment  Patient's communication style: spoken language (English or Bilingual)    Hearing Difficulty or Deaf: no   Wear Glasses or Blind: yes    Cognitive  Cognitive/Neuro/Behavioral: WDL                      Living Environment:   People in home: spouse     Current living Arrangements: house      Able to return to prior arrangements:         Family/Social Support:  Care provided by:    Provides care for:                  Description of Support System:           Current Resources:   Patient receiving home care services:       Community Resources:    Equipment currently used at home: none  Supplies currently used at home:            Does the patient's insurance plan have a 3 day qualifying hospital stay waiver?  No              Additional Information:  Pt lives with spouse in private residence.  Pt is independent at baseline.  Anticipate pt will be able to return home at discharge.   TAPIA notice reviewed with pt and , pt signed TAPIA.    HYACINTH Pabon

## 2024-04-11 NOTE — PROGRESS NOTES
"[facility]    Medicine Progress Note - Hospitalist Service    Date of Admission:  4/10/2024    Assessment & Plan      Teetee Tanner is a 84 year old female admitted on 4/10/2024. She has a hx of asthma, GERD, hypothyroidism, HLD presents with acute asthma exacerbation from recent influenza A infection reported ~2-3 weeks ago. Hyponatremia noted Na 129 --> 128; work-up as below.     Acute Asthma Exacerbation  Acute Hypoxic Respiratory Failure  -CXR/CT demonstrated no consolidations.  -Utilize IV steroids and then transition to PO once clinically improved. Continue IV steroids for another day/dose before transitioning.   -Duonebs ordered scheduled initially and then transition/wean as per RT as per protocol pending clinical improvement.  -Antibiotics: not indicated  -Oxygen O2 supplementation to maintain O2 >90%. Wean O2 as tolerated.  -RCAT as needed.    Hyponatremia  -Initial Na noted at 129 --> 128.   -Clarify etiology - order urine Na, urine osmolality, and serum osmolality to further evaluate etiology.        GERD  -Order home antacids/medications.     HLD  -Order home statin.    Hypothyroidism  -Order home thyroid replacement/levothyroxine.           Diet: Combination Diet Regular Diet Adult    DVT Prophylaxis: Enoxaparin (Lovenox) SQ, Pneumatic Compression Devices, Anti-embolisim stockings (TEDs), and Ambulate every shift  Beatty Catheter: Not present  Lines: None     Cardiac Monitoring: None  Code Status: Full Code      Clinically Significant Risk Factors Present on Admission         # Hyponatremia: Lowest Na = 128 mmol/L in last 2 days, will monitor as appropriate          # Hypertension: Home medication list includes antihypertensive(s)      # Overweight: Estimated body mass index is 27.03 kg/m  as calculated from the following:    Height as of this encounter: 1.626 m (5' 4\").    Weight as of this encounter: 71.4 kg (157 lb 8 oz).       # Asthma: noted on problem list    "     Disposition Plan     Medically Ready for Discharge: Anticipated Tomorrow or next day             Michel Caldera MD  Hospitalist Service  [facility]  Securely message with [employer] (more info)  Text page via [employer] Paging/Directory   ______________________________________________________________________    Interval History   No acute events overnight.    Still having several paroxysms of coughing; various PRN's have helped when administered. No report of chest pain, shortness of breath, or other new complaints. Discussed plan of care with patient. Answered all questions to patient's verbalized understanding and satisfaction.        Physical Exam   Vital Signs: Temp: 97.7  F (36.5  C) Temp src: Oral BP: (!) 157/70 Pulse: 67   Resp: 18 SpO2: 97 % O2 Device: None (Room air) Oxygen Delivery: 2 LPM  Weight: 157 lbs 8 oz    GENERAL:  Alert, appears comfortable, in no acute distress, appears stated age, coughing very frequently   HEAD:  Normocephalic, without obvious abnormality, atraumatic   EYES:  Conjunctiva/corneas clear, no scleral icterus, EOM's appears intact grossly   NOSE: Nares normal, septum midline, mucosa normal, no drainage   THROAT: Lips, mucosa, and tongue appear normal   NECK: Symmetrical, trachea appears midline   BACK:   Symmetric, no curvature noted   LUNGS:   Symmetric chest rise on inhalation, respirations unlabored   CHEST WALL:  No apparent deformity   HEART:  No thrills or heaves visualized   ABDOMEN:   No masses or organomegaly apparent; non-scaphoid   EXTREMITIES: Extremities appear normal, atraumatic, no cyanosis   SKIN: No exanthems in the visualized areas   NEURO: Alert and oriented x3, moves all four extremities freely and spontaneously   PSYCH: Cooperative, behavior is appropriate     Medical Decision Making       42 MINUTES SPENT BY ME on the date of service doing chart review, history, exam, documentation & further activities per the note.      Data     I have  personally reviewed the following data over the past 24 hrs:    1.8 (L)  \   12.4   / 167     128 (L) 93 (L) 9.4 /  159 (H)   4.1 29 0.53 \     Trop: 15 (H) BNP: N/A       Imaging results reviewed over the past 24 hrs:   Recent Results (from the past 24 hour(s))   XR Chest Port 1 View    Narrative    EXAM: XR CHEST PORT 1 VIEW  LOCATION: [facility]  DATE: 4/10/2024    INDICATION: PMHx of asthma, worsening dyspnea, cough, wheezing.  COMPARISON: 02/22/2023      Impression    IMPRESSION: Negative chest.

## 2024-04-11 NOTE — SIGNIFICANT EVENT
Significant Event Note- Remote Coverage MD    Time of event:9:05 PM April 10, 2024    Description of event:  Paged regarding patient who is being covered by swing shift till 10pm. Nursing unable to reach covering MD.     Paged regarding cough     Plan:  -Ordered mucinex   -Ordered lozenges    Discussed with: bedside nurse    Janelle Jiang MD

## 2024-04-11 NOTE — UTILIZATION REVIEW
"Inpatient to Observation note:    Admission Status; Secondary Review Determination     Under the authority of the Utilization Management Committee, the utilization review process indicated a secondary review on the above patient.  The review outcome is based on review of the medical records, discussions with staff, and applying clinical experience noted on the date of the review.          (x) Observation Status Appropriate - This patient does not meet hospital inpatient criteria and is placed in observation status. If this patient's primary payer is Medicare and was admitted as an inpatient, Condition Code 44 should be used and patient status changed to \"observation\".     RATIONALE FOR DETERMINATION     85 yo F with past medical history of asthma, GERD, hypertension, HLD admitted for acute asthma exacerbation.  Recent influenza A infection. Off oxygen and cessation from IV methylprednisolone to oral prednisone      The severity of illness, intensity of service provided, expected LOS and risk for adverse outcome make the care appropriate for further observation; however, doesn't meet criteria for hospital inpatient admission. Dr Caldera notified of this determination.        The information on this document is developed by the utilization review team in order for the business office to ensure compliance.  This only denotes the appropriateness of proper admission status and does not reflect the quality of care rendered.         The definitions of Inpatient Status and Observation Status used in making the determination above are those provided in the CMS Coverage Manual, Chapter 1 and Chapter 6, section 70.4.      Sincerely,  Brendon Lugo MD  Utilization Review  Physician Advisor  [facility].   "

## 2024-04-11 NOTE — PLAN OF CARE
"PRIMARY DIAGNOSIS: \"GENERIC\" NURSING  OUTPATIENT/OBSERVATION GOALS TO BE MET BEFORE DISCHARGE:  ADLs back to baseline: No    Activity and level of assistance: Up with standby assistance.    Pain status: Improved-controlled with oral pain medications.    Return to near baseline physical activity: Yes     Discharge Planner Nurse   Safe discharge environment identified: Yes  Barriers to discharge: Yes       Entered by: Santo Laguna RN 04/11/2024 3:25 PM     Please review provider order for any additional goals.   Nurse to notify provider when observation goals have been met and patient is ready for discharge.Goal Outcome Evaluation:    Patient alert and oriented x 4, speech clear.  On room air.  RT called x 2 for neb treatment and new order obtained.  Lungs diminished, coarse with expiratory wheeze.  Very hoarse cough.  Up to bathroom with SBA to void.  NO BM last BM 4/9/24.  No edema.  Given Tesselon Pearls prn for cough.  Denies pain.  Barriers:  fatiques easily and at home is independent with ADL's and mobility. Patient requested Miralax as she reports taking daily at home      "

## 2024-04-11 NOTE — PROGRESS NOTES
Pt received albuterol neb x2 PRN. BS coarse diminished. Pt remains on room air. Due to high volume of PRN I scheduled pt to Q4H WA. Pt perceives improvement.    Gbaby Weston, RT

## 2024-04-11 NOTE — PLAN OF CARE
Goal Outcome Evaluation:      Problem: Risk for Delirium  Goal: Improved Sleep  Intervention: Promote Sleep  Recent Flowsheet Documentation  Taken 4/11/2024 0900 by Santo Laguna RN  Sleep/Rest Enhancement: regular sleep/rest pattern promoted     Problem: Adult Inpatient Plan of Care  Goal: Absence of Hospital-Acquired Illness or Injury  Intervention: Identify and Manage Fall Risk  Recent Flowsheet Documentation  Taken 4/11/2024 0900 by Santo Laguna RN  Safety Promotion/Fall Prevention:   safety round/check completed   room near nurse's station   nonskid shoes/slippers when out of bed   clutter free environment maintained       Patient alert and oriented x 4, speech clear.  Weaned off oxygen throughout night.  On room air saturation 90's.  Patient has coarse productive frequent cough, SOB with exertion and lungs coarse.  Given Mucinex, Solumedrol, Breo Elliptor inhaler.  Appetite good, drinking fluids.  No edema.  Up to bathroom with SBA to void.  4/9/24 last BM.  Urine orange and patient took Azo prior to admission.

## 2024-04-11 NOTE — PLAN OF CARE
Problem: Adult Inpatient Plan of Care  Goal: Optimal Comfort and Wellbeing  Outcome: Progressing     Problem: Risk for Delirium  Goal: Improved Sleep  Outcome: Progressing  Intervention: Promote Sleep  Recent Flowsheet Documentation  Taken 4/11/2024 0000 by Tracy Salas RN  Sleep/Rest Enhancement:   regular sleep/rest pattern promoted   room darkened  Taken 4/10/2024 2000 by Tracy Salas RN  Sleep/Rest Enhancement:   regular sleep/rest pattern promoted   room darkened     Problem: Gas Exchange Impaired  Goal: Optimal Gas Exchange  Outcome: Progressing  Intervention: Optimize Oxygenation and Ventilation  Recent Flowsheet Documentation  Taken 4/11/2024 0000 by Tracy Salas RN  Head of Bed (HOB) Positioning: HOB at 30-45 degrees  Taken 4/10/2024 2000 by Tracy Salas RN  Head of Bed (HOB) Positioning: HOB at 30-45 degrees   Goal Outcome Evaluation:       A&ox4. Reports chest and throat soreness from incessant cough. Paged RT 2x for neb. PRN pearls, mucinex & warm water given. Some relief reported. Paged MD if any additionally medications could be ordered- stated to keep HOB raised. Coarse lung sounds with expiratory wheezing. Able to remove o2 entirely- vitally stable. Up to the bathroom SBA frequently. No other acute changes.

## 2024-04-12 VITALS
DIASTOLIC BLOOD PRESSURE: 77 MMHG | HEIGHT: 64 IN | OXYGEN SATURATION: 94 % | RESPIRATION RATE: 16 BRPM | BODY MASS INDEX: 26.89 KG/M2 | WEIGHT: 157.5 LBS | SYSTOLIC BLOOD PRESSURE: 167 MMHG | TEMPERATURE: 97.8 F | HEART RATE: 64 BPM

## 2024-04-12 PROCEDURE — 250N000013 HC RX MED GY IP 250 OP 250 PS 637: Performed by: HOSPITALIST

## 2024-04-12 PROCEDURE — 999N000157 HC STATISTIC RCP TIME EA 10 MIN

## 2024-04-12 PROCEDURE — 94640 AIRWAY INHALATION TREATMENT: CPT

## 2024-04-12 PROCEDURE — 99239 HOSP IP/OBS DSCHRG MGMT >30: CPT | Performed by: HOSPITALIST

## 2024-04-12 PROCEDURE — 250N000009 HC RX 250: Performed by: STUDENT IN AN ORGANIZED HEALTH CARE EDUCATION/TRAINING PROGRAM

## 2024-04-12 PROCEDURE — 250N000012 HC RX MED GY IP 250 OP 636 PS 637: Performed by: HOSPITALIST

## 2024-04-12 PROCEDURE — 96372 THER/PROPH/DIAG INJ SC/IM: CPT | Performed by: HOSPITALIST

## 2024-04-12 PROCEDURE — 250N000011 HC RX IP 250 OP 636: Performed by: HOSPITALIST

## 2024-04-12 PROCEDURE — 250N000013 HC RX MED GY IP 250 OP 250 PS 637: Performed by: INTERNAL MEDICINE

## 2024-04-12 PROCEDURE — G0378 HOSPITAL OBSERVATION PER HR: HCPCS

## 2024-04-12 RX ORDER — PREDNISONE 20 MG/1
TABLET ORAL
Qty: 13 TABLET | Refills: 0 | Status: SHIPPED | OUTPATIENT
Start: 2024-04-12 | End: 2024-04-22

## 2024-04-12 RX ORDER — BENZONATATE 100 MG/1
100 CAPSULE ORAL 3 TIMES DAILY PRN
Qty: 15 CAPSULE | Refills: 0 | Status: SHIPPED | OUTPATIENT
Start: 2024-04-12

## 2024-04-12 RX ORDER — LANOLIN ALCOHOL/MO/W.PET/CERES
3 CREAM (GRAM) TOPICAL
Status: COMPLETED | OUTPATIENT
Start: 2024-04-12 | End: 2024-04-12

## 2024-04-12 RX ORDER — GUAIFENESIN 600 MG/1
600 TABLET, EXTENDED RELEASE ORAL 2 TIMES DAILY
Qty: 8 TABLET | Refills: 0 | Status: SHIPPED | OUTPATIENT
Start: 2024-04-12 | End: 2024-04-16

## 2024-04-12 RX ADMIN — LEVOTHYROXINE SODIUM 50 MCG: 0.05 TABLET ORAL at 06:54

## 2024-04-12 RX ADMIN — BENZONATATE 100 MG: 100 CAPSULE ORAL at 04:57

## 2024-04-12 RX ADMIN — ENOXAPARIN SODIUM 40 MG: 100 INJECTION SUBCUTANEOUS at 09:15

## 2024-04-12 RX ADMIN — ALBUTEROL SULFATE 2.5 MG: 2.5 SOLUTION RESPIRATORY (INHALATION) at 07:47

## 2024-04-12 RX ADMIN — Medication 25 MCG: at 09:12

## 2024-04-12 RX ADMIN — LISINOPRIL 5 MG: 5 TABLET ORAL at 09:12

## 2024-04-12 RX ADMIN — GUAIFENESIN 600 MG: 600 TABLET ORAL at 09:12

## 2024-04-12 RX ADMIN — FLUTICASONE FUROATE AND VILANTEROL TRIFENATATE 1 PUFF: 100; 25 POWDER RESPIRATORY (INHALATION) at 09:13

## 2024-04-12 RX ADMIN — Medication 3 MG: at 01:02

## 2024-04-12 RX ADMIN — PREDNISONE 40 MG: 20 TABLET ORAL at 09:12

## 2024-04-12 ASSESSMENT — ACTIVITIES OF DAILY LIVING (ADL)
ADLS_ACUITY_SCORE: 23

## 2024-04-12 NOTE — PROGRESS NOTES
Care Management Discharge Note    Discharge Date: 04/12/2024       Discharge Disposition: Home    Discharge Services: None    Discharge DME: None    Discharge Transportation:      Private pay costs discussed: Not applicable    Does the patient's insurance plan have a 3 day qualifying hospital stay waiver?  No    PAS Confirmation Code: N/A  Patient/family educated on Medicare website which has current facility and service quality ratings: no    Education Provided on the Discharge Plan: No  Persons Notified of Discharge Plans: Pt   Patient/Family in Agreement with the Plan: yes    Handoff Referral Completed: No    Additional Information:  Chart reviewed. No CM needs identified. Pt lives with .  Family to transport    HYACINTH Millan

## 2024-04-12 NOTE — PROVIDER NOTIFICATION
Patient received scheduled Q4 WA nebulizer tx via mask, she states that she feels her breathing is better post tx. SAT 97% on RA. Peak flow pre 200/post 200. She has a strong cough that is productive some of the time. Education was provided about inhalers at bedside as she was also using her home Advair/Albuterol inhaler while also receiving hospital Breo and Albuterol nebulizer. RN was made aware so no extra dosing would be administered. Currently on RA, CXR currently shows no consolidation at this time. Will continue Q4 WA at this time and continue to monitor care.       04/12/24 0530   RCAT Assessment   Reason for Assessment Asthma   Pulmonary Status 3   Surgical Status 0   Chest X-ray 0   Respiratory Pattern 1   Mental Status 0   Breath Sounds 2   Cough Effectiveness 1   Level of Activity 1   O2 Required for SpO2>=92% 0   Acuity Level (points) 8   Acuity Level  4   Re-eval Interval Guideline Every 3 days   Re-evaluation Date 04/15/24   Clinical Indications/Symptoms   Aerosol Therapy RCAT protocol;Bronchospasm   Broncho-pulmonary Hygiene Productive cough   Breath Sounds   Breath Sounds All Fields   All Lung Fields Breath Sounds Anterior:;coarse;diminished

## 2024-04-12 NOTE — DISCHARGE SUMMARY
"[facility]  Hospitalist Discharge Summary      Date of Admission:  4/10/2024  Date of Discharge:  4/12/2024  Discharging Provider: Michel Caldera MD  Discharge Service: Hospitalist Service    Discharge Diagnoses   Acute Asthma Exacerbation  Acute Hypoxic Respiratory Failure  Hyponatremia, mild, asymptomatic  GERD  HLD  Hypothyroidism    Clinically Significant Risk Factors     # Overweight: Estimated body mass index is 27.03 kg/m  as calculated from the following:    Height as of this encounter: 1.626 m (5' 4\").    Weight as of this encounter: 71.4 kg (157 lb 8 oz).       Follow-ups Needed After Discharge   Follow-up Appointments     Follow-up and recommended labs and tests       Follow up with primary care provider, Jasmyn Tony, within 5-7 days   for hospital follow- up.  Repeat Basic Metabolic Panel for mild asymptomatic hyponatremia in 3-5   days.            Unresulted Labs Ordered in the Past 30 Days of this Admission       No orders found from 3/11/2024 to 4/11/2024.        These results will be followed up by NA    Discharge Disposition   Discharged to home  Condition at discharge: Good    Hospital Course   Teetee Tanner is a 84 year old female admitted on 4/10/2024. She has a hx of asthma, GERD, hypothyroidism, HLD presents with acute asthma exacerbation from recent influenza A infection reported ~2-3 weeks ago.     Admitted on IV methylprednisolone and received nebulized albuterol and supportive respiratory cares. Asymptomatic mild hyponatremia noted Na 129 stable to 128; work-up consistent with mild hypovolemia in setting of acute illness, IVF and oral intake encouraged, asymptomatic. IV methylprednisolone and PRN medications for cough helped her symptoms and clinically she improved. Oxygen was completely weaned off to ambient room air. She was monitored further and did not require any oxygen support, and continued to clinically improve. Transitioned to oral prednisone " with taper, and PCP follow-up recommended with repeat BMP.     Consultations This Hospital Stay   RESPIRATORY CARE IP CONSULT  CARE MANAGEMENT / SOCIAL WORK IP CONSULT    Code Status   Full Code    Time Spent on this Encounter   I, Michel Caldera MD, personally saw the patient today and spent greater than 30 minutes discharging this patient.       Michel Caldera MD  [address] [address]  Phone: [phone]  Fax: [phone]  ______________________________________________________________________    Physical Exam   Vital Signs: Temp: 97.8  F (36.6  C) Temp src: Oral BP: (!) 167/77 Pulse: 64   Resp: 16 SpO2: 94 % O2 Device: None (Room air)    Weight: 157 lbs 8 oz  GENERAL:  Alert, appears comfortable, in no acute distress, appears stated age   HEAD:  Normocephalic, without obvious abnormality, atraumatic   EYES:  PERRL, conjunctiva/corneas clear, no scleral icterus, EOM's intact   NOSE: Nares normal, septum midline, mucosa normal, no drainage   THROAT: Lips, mucosa, and tongue normal; teeth and gums normal, mouth moist   NECK: Supple, symmetrical, trachea midline   BACK:   Symmetric, no curvature, ROM normal   LUNGS:   No wheezing, symmetric chest rise on inhalation, respirations unlabored   CHEST WALL:  No tenderness or deformity   HEART:  Regular rate and rhythm, S1 and S2 normal, no murmur, rub, or gallop    ABDOMEN:   Soft, non-tender, bowel sounds active all four quadrants, no masses, no organomegaly, no rebound or guarding   EXTREMITIES: Extremities normal, atraumatic, no cyanosis or edema    SKIN: Dry to touch, no exanthems in the visualized areas   NEURO: Alert, oriented x 4, moves all four extremities freely/spontaneously   PSYCH: Cooperative, behavior is appropriate         Primary Care Physician   Jasmyn Tony    Discharge Orders      Reason for your hospital stay    Acute asthma exacerbation and symptomatic treatment of  coughing from that and residual influenza infection.     Activity    Your activity upon discharge: activity as tolerated     Follow-up and recommended labs and tests     Follow up with primary care provider, Jasmyn Tony, within 5-7 days for hospital follow- up.  Repeat Basic Metabolic Panel for mild asymptomatic hyponatremia in 3-5 days.     Diet    Follow this diet upon discharge: Orders Placed This Encounter      Combination Diet Regular Diet Adult         Significant Results and Procedures   Most Recent 3 CBC's:  Recent Labs   Lab Test 04/11/24  0547 04/10/24  1030 07/05/22  1350   WBC 1.8* 3.0* 8.0   HGB 12.4 13.5 13.0   MCV 89 91 93    158 220     Most Recent 3 BMP's:  Recent Labs   Lab Test 04/11/24  0834 04/11/24  0547 04/10/24  1030 07/05/22  1350   NA  --  128* 129* 134*   POTASSIUM  --  4.1 4.3 3.6   CHLORIDE  --  93* 94* 99   CO2  --  29 26 24   BUN  --  9.4 7.3* 8   CR 0.52 0.53 0.57 0.64   ANIONGAP  --  6* 9 11   ENOC  --  9.2 8.8 9.1   GLC  --  159* 94 140*     Most Recent 2 LFT's:  Recent Labs   Lab Test 05/13/22  1050 12/09/18  2045   AST 28 31   ALT 16 19   ALKPHOS 64 74   BILITOTAL 0.6 0.6   ,   Results for orders placed or performed during the hospital encounter of 04/10/24   XR Chest Port 1 View    Narrative    EXAM: XR CHEST PORT 1 VIEW  LOCATION: [facility]  DATE: 4/10/2024    INDICATION: PMHx of asthma, worsening dyspnea, cough, wheezing.  COMPARISON: 02/22/2023      Impression    IMPRESSION: Negative chest.       Discharge Medications   Current Discharge Medication List        START taking these medications    Details   benzonatate (TESSALON) 100 MG capsule Take 1 capsule (100 mg) by mouth 3 times daily as needed for cough  Qty: 15 capsule, Refills: 0    Associated Diagnoses: Influenza A; Asthma with acute exacerbation, unspecified asthma severity, unspecified whether persistent      guaiFENesin (MUCINEX) 600 MG 12 hr tablet Take 1 tablet (600 mg) by  mouth 2 times daily for 4 days  Qty: 8 tablet, Refills: 0    Associated Diagnoses: Influenza A; Asthma with acute exacerbation, unspecified asthma severity, unspecified whether persistent      predniSONE (DELTASONE) 20 MG tablet Take 2 tablets (40 mg) by mouth daily (with breakfast) for 4 days, THEN 1 tablet (20 mg) daily (with breakfast) for 3 days, THEN 0.5 tablets (10 mg) daily (with breakfast) for 3 days.  Qty: 13 tablet, Refills: 0    Associated Diagnoses: Asthma with acute exacerbation, unspecified asthma severity, unspecified whether persistent           CONTINUE these medications which have NOT CHANGED    Details   albuterol (PROAIR HFA/PROVENTIL HFA/VENTOLIN HFA) 108 (90 Base) MCG/ACT inhaler Inhale 2 puffs into the lungs every 4 hours as needed      Coenzyme Q10 400 MG CAPS Take 1 capsule by mouth daily In the morning      fluticasone-salmeterol (ADVAIR HFA) 115-21 MCG/ACT inhaler Inhale 2 puffs into the lungs 2 times daily Rinse mouth after use to prevent oral thrush.      !! levothyroxine (SYNTHROID) 100 MCG tablet Take 50 mcg by mouth twice a week Take 0.5 tablet (50 mcg) by mouth twice weekly on Tu/F. Please dispense Synthroid only.      lisinopril (ZESTRIL) 5 MG tablet Take 5 mg by mouth daily In the morning      LORazepam (ATIVAN) 0.5 MG tablet Take 1 tablet (0.5 mg) by mouth every 6 hours as needed (vertigo)  Qty: 20 tablet, Refills: 0      meclizine (ANTIVERT) 25 MG tablet Take 1 tablet (25 mg) by mouth 4 times daily as needed for dizziness  Qty: 20 tablet, Refills: 0      !! SYNTHROID 100 MCG tablet Take 100 mcg by mouth five times a week Take one tablet by mouth on M/W/Th/Sa/Su. Please dispense Synthroid only.      Vitamin D3 (CHOLECALCIFEROL) 25 mcg (1000 units) tablet Take 25 mcg by mouth daily       !! - Potential duplicate medications found. Please discuss with provider.        Allergies   Allergies   Allergen Reactions    Celery Oil Hives     Cutaneous reactions    Statins Other (See  "Comments)     Pt has mitochondrial disorder. Seen at [facility] for this. Told never to take statins      Cephalosporins Nausea and Vomiting and Rash     Tolerates cephalexin    Codeine Hives and Confusion    Diphenhydramine Hcl [Diphenhydramine] Hives     Hyperactive     Morphine Other (See Comments)     \"Makes me hyper\"      Mushroom GI Disturbance     Cause vomiting    Oxycodone     Penicillins Hives    Sulfa Antibiotics Hives    Dust Mites Other (See Comments)     Throat Irritation     Fumaric Acid Rash    Mold Other (See Comments)     Throat Irritation    Nitrofurantoin Muscle Pain (Myalgia)    Pineapple Other (See Comments)     Difficulty swallowing. Also her mouth gets raw.      Proparacaine Rash    Tetracyclines & Related Unknown    Tomato Other (See Comments)     Raw mouth       "

## 2024-04-12 NOTE — PROGRESS NOTES
"PRIMARY DIAGNOSIS: \"GENERIC\" NURSING  OUTPATIENT/OBSERVATION GOALS TO BE MET BEFORE DISCHARGE:  ADLs back to baseline: Yes    Activity and level of assistance: Up with standby assistance.    Pain status: Improved-controlled with oral pain medications.    Return to near baseline physical activity: Yes     "

## 2024-04-25 ENCOUNTER — DOCUMENTATION ONLY (OUTPATIENT)
Dept: OTHER | Facility: CLINIC | Age: 84
End: 2024-04-25
Payer: MEDICARE

## 2024-10-18 ENCOUNTER — HOSPITAL ENCOUNTER (EMERGENCY)
Facility: CLINIC | Age: 84
Discharge: HOME | End: 2024-10-18
Attending: EMERGENCY MEDICINE | Admitting: EMERGENCY MEDICINE
Payer: MEDICARE

## 2024-10-18 VITALS
SYSTOLIC BLOOD PRESSURE: 170 MMHG | RESPIRATION RATE: 18 BRPM | HEIGHT: 64 IN | WEIGHT: 146 LBS | OXYGEN SATURATION: 99 % | HEART RATE: 67 BPM | TEMPERATURE: 97.8 F | BODY MASS INDEX: 24.92 KG/M2 | DIASTOLIC BLOOD PRESSURE: 74 MMHG

## 2024-10-18 DIAGNOSIS — N39.0 ACUTE UTI: ICD-10-CM

## 2024-10-18 PROCEDURE — 99283 EMERGENCY DEPT VISIT LOW MDM: CPT

## 2024-10-18 RX ORDER — CEFDINIR 300 MG/1
300 CAPSULE ORAL 2 TIMES DAILY
Qty: 10 CAPSULE | Refills: 0 | Status: SHIPPED | OUTPATIENT
Start: 2024-10-18 | End: 2024-10-23

## 2024-10-18 ASSESSMENT — ACTIVITIES OF DAILY LIVING (ADL): ADLS_ACUITY_SCORE: 36

## 2024-10-18 ASSESSMENT — COLUMBIA-SUICIDE SEVERITY RATING SCALE - C-SSRS
6. HAVE YOU EVER DONE ANYTHING, STARTED TO DO ANYTHING, OR PREPARED TO DO ANYTHING TO END YOUR LIFE?: NO
2. HAVE YOU ACTUALLY HAD ANY THOUGHTS OF KILLING YOURSELF IN THE PAST MONTH?: NO
1. IN THE PAST MONTH, HAVE YOU WISHED YOU WERE DEAD OR WISHED YOU COULD GO TO SLEEP AND NOT WAKE UP?: NO

## 2024-10-18 NOTE — DISCHARGE INSTRUCTIONS
You were prescribed Omnicef, an antibiotic. Please take this twice a day for 5 days. You may continue to use the pyridium as needed to help manage your symptoms.     If you develop fever and chills, nausea and vomiting, abdominal pain, or any new symptoms, please return to the Emergency Department for evaluation.

## 2024-10-18 NOTE — ED PROVIDER NOTES
"Emergency Department Midlevel Supervisory Note     I had a face to face encounter with this patient seen by the Advanced Practice Provider (BLAKE). I personally made/approved the management plan and take responsibility for the patient management. I personally saw patient and performed a substantive portion of the visit including all aspects of the medical decision making.     ED Course:  9:08 AM Rosa Chou PA-C staffed patient with me. I agree with their assessment and plan of management, and I will see the patient.  9:10 AM I met with the patient to introduce myself, gather additional history, perform my initial exam, and discuss the plan.     Brief HPI:     Teetee Tanner is a 84 year old female who presents for evaluation of an UTI. Pt diagnosed with UTI at [facility] 4 days ago, started on macrobid, but not tolerating.  Pt has numerous allergies, so she was told to just come to ED for treatment/evaluation.  Urine culture from 10/14/24 shows mixed federico growth.  Previous one to that grew citrobacter susceptible to ceftriaxone and pt tolerates cephalosporins. She has no abdominal pain, flank pain/tenderness or fevers. She's well appearing. She does report dysuria, but improvement with pyridium she's taking.      I, Vinod Sanders, am serving as a scribe to document services personally performed by Juan Montesinos DO, based on my observations and the provider's statements to me.   I, Juan Montesinos DO, attest that Vinod Sanders was acting in a scribe capacity, has observed my performance of the services and has documented them in accordance with my direction.    Brief Physical Exam: BP (!) 170/74   Pulse 67   Temp 97.8  F (36.6  C)   Resp 18   Ht 1.626 m (5' 4\")   Wt 66.2 kg (146 lb)   SpO2 99%   BMI 25.06 kg/m    Physical Exam  Vitals and nursing note reviewed.   Constitutional:       General: She is not in acute distress.     Appearance: Normal appearance.   HENT:      Head: Normocephalic " and atraumatic.      Nose: Nose normal.      Mouth/Throat:      Mouth: Mucous membranes are moist.   Eyes:      Extraocular Movements: Extraocular movements intact.   Cardiovascular:      Rate and Rhythm: Normal rate and regular rhythm.      Pulses: Normal pulses.           Radial pulses are 2+ on the right side and 2+ on the left side.        Dorsalis pedis pulses are 2+ on the right side and 2+ on the left side.   Pulmonary:      Effort: Pulmonary effort is normal.   Abdominal:      Tenderness: There is no abdominal tenderness. There is no right CVA tenderness or left CVA tenderness. Negative signs include McBurney's sign.   Skin:     Findings: No rash.   Neurological:      General: No focal deficit present.      Mental Status: She is alert. Mental status is at baseline.      Comments: Fluent speech   Psychiatric:         Mood and Affect: Mood normal.         Behavior: Behavior normal.            MDM:  Pt seen in conjunction with BLAKE Rosa Farr.  She had a recent UA performed with [facility] on 10/14/24 suggestive of infection, started on macrobid, which she's tolerated before, but she refused to continue. Pt was advised by triage to come to ED as a result.  She has various allergies to medications, which are primarily side effects.  She does tolerate cephalosporins per review.  Her culture from 4 days ago did not show in specific bacterial growth, but previous culture grew citrobacter sensitive to ceftriaxone. As a result, we could certainly start her on cefdinir. Will get repeat UA here.  No indication for serum labs or CT imaging based on exam/history.       1. Acute UTI          Juan Montesinos DO  [facility] EMERGENCY ROOM  [address] [facility] [address] [phone]       Juan Montesinos MD  10/18/24 7243

## 2024-10-18 NOTE — ED TRIAGE NOTES
Pt reports being seen at [facility] and diagnosed with UTI. Pt has numerous antibiotic allergies and was prescribed the wrong antibiotic and told to come to seek further treatment.     Triage Assessment (Adult)       Row Name 10/18/24 0843          Triage Assessment    Airway WDL WDL        Respiratory WDL    Respiratory WDL X;cough     Cough Frequency infrequent        Skin Circulation/Temperature WDL    Skin Circulation/Temperature WDL WDL        Cardiac WDL    Cardiac WDL WDL        Peripheral/Neurovascular WDL    Peripheral Neurovascular WDL WDL        Cognitive/Neuro/Behavioral WDL    Cognitive/Neuro/Behavioral WDL WDL

## 2024-10-18 NOTE — ED PROVIDER NOTES
Emergency Department Encounter   NAME: Teetee Tanner ; AGE: 84 year old female ; YOB: 1940 ; MRN: [id number] ; PCP: Jasmyn Tony   ED PROVIDER: Rosa Farr PA-C    Evaluation Date & Time:   10/18/2024  8:50 AM    CHIEF COMPLAINT:  UTI      Impression and Plan   MDM: Teetee Tanner is a 84 year old female with a pertinent history of UTIs, hypothyroidism, asthma, myopathy/myalgias who presents to the ED for evaluation of UTI.  Patient reports that symptoms began on Sunday evening, urinating quite frequently, noting some burning with urination.  No notable hematuria.  Developed bone pain and chills while using the bathroom, which she states is normal for her when she has had UTIs in the past.  Denies flank pain, abdominal pain, fever, nausea or vomiting.  Patient had urine cultures completed on 10/14 and 10/15 through primary care, the 10/15 did appear to be infected.    Patient has multiple allergies/sensitivities to medications however has tolerated Macrobid in the past which is what primary care started.  Patient refused to start this medication and was advised to seek treatment in the ED due to limited other antibiotic options and the potential of IV antibiotics.    Per chart review,  - 10/14 Recurrent UTI standing order for urine culture was negative for bacteria  - 10/15 FM visit, repeat UA and culture ordered which does show growth, no sensitivities at the time, was placed on Macrobid  - 10/17 telephone visit, patient did not start as she does not like the side effects/states she has an allergy and was recommended to come to ED    On exam, patient is laying comfortably in bed, wearing sunglasses and appears to be in no acute distress. Physical exam is overall benign with no suprapubic or abdominal tenderness and no CVA tenderness bilaterally. I considered ordering a UA/culture however patient has had multiple completed this week, with most recent 10/15 showing signs of infection, and  has not had completed treatment; in this case, I think it is reasonable to treat her known infection without repeat UA. Patient has multiple drug sensitivities listed in her chart however it appears she has tolerated cephalexin in the past. Due to this, its likely the patient will tolerate Cefdinir as well; discussed this option with patient who agreed to try this oral option.     Return precautions to the ED were discussed, patient verbalized understanding and were agreeable with the plan. All questions were answered.     Medical Decision Making  Obtained supplemental history:Supplemental history obtained?: No  Reviewed external records: External records reviewed?: Outpatient Record: 10/9 urology, 10/16 FM 10/15 FM, 10/17 phone, Prior Labs: 10/14 UA and culture, 10/15, and Prior Imagin[facility]  renal   Care impacted by chronic illness:Chronic Pain and Mental Health  Did you consider but not order tests?: Work up considered but not performed and documented in chart, if applicable  Did you interpret images independently?: Independent interpretation of ECG and images noted in documentation, when applicable.  Consultation discussion with other provider:Did you involve another provider (consultant, , pharmacy, etc.)?: No  Discharge. I prescribed additional prescription strength medication(s) as charted. I considered admission, but discharged patient after significant clinical improvement.    MIPS:  Not Applicable    ED COURSE:  9:01 AM I met and introduced myself to the patient. I gathered initial history and performed my physical exam. We discussed plan for initial workup.   9:10 AM I have staffed the patient with Dr. Montesinos, ED MD, who has evaluated the patient and agrees with all aspects of today's care.   9:21 AM I rechecked the patient and discussed results, discharge, follow up, and reasons to return to the ED.       FINAL IMPRESSION:    ICD-10-CM    1. Acute UTI  N39.0             MEDICATIONS GIVEN IN THE  EMERGENCY DEPARTMENT:  Medications - No data to display      NEW PRESCRIPTIONS STARTED AT TODAY'S ED VISIT:  New Prescriptions    CEFDINIR (OMNICEF) 300 MG CAPSULE    Take 1 capsule (300 mg) by mouth 2 times daily for 5 days.         HPI   Use of Intrepreter: N/A     Teetee Tanner is a 84 year old female with a pertinent history of asthma, osteoarthritis, hypothyroidism, mitochondrial myopathy who presents to the ED by walk in for evaluation of UTI.  Reports symptoms began Sunday evening, she has been urinating more frequently and having pain with urination.  Denies any notable blood.  Also reports chills and bone pain which she states is normal for her when she gets a UTI.  Denies fever, nausea or vomiting, abdominal pain.  Denies blood in her stools.  No flank pain.    Patient was seeing her primary care provider for this and was prescribed Macrobid, however patient states that she has an allergy to this and is not willing to take it although she has tolerated this in the past.  Patient has multiple allergies/sensitivities to medications which makes treatment of this difficult.  Her PCP recommended being evaluated in the ED due to the possibility of needing IV antibiotics.    REVIEW OF SYSTEMS:  Pertinent positive and negative symptoms per HPI.       Medical History     No past medical history on file.    Past Surgical History:   Procedure Laterality Date    EXCISE BREAST CYST/FIBROADENOMA/TUMOR/DUCT LESION/NIPPLE LESION/AREOLAR LESION      Description: Breast Surgery Lumpectomy;  Recorded: 02/27/2008;    HC EXCISION NASAL POLYP(S), EXTENSIVE      Description: Extensive Excision Of Nasal Polyps;  Recorded: 02/27/2008;    HC REMOVE TONSILS/ADENOIDS,<13 Y/O      Description: Tonsillectomy With Adenoidectomy;  Recorded: 02/27/2008;    NM VAGINAL HYSTERECTOMY,UTERUS 250 GMS/<      Description: Vaginal Hysterectomy;  Proc Date: 02/25/2005;  Comments: BSO, Bladder repair       No family history on file.    Social  "History     Tobacco Use    Smoking status: Never   Substance Use Topics    Alcohol use: No    Drug use: No       cefdinir (OMNICEF) 300 MG capsule  albuterol (PROAIR HFA/PROVENTIL HFA/VENTOLIN HFA) 108 (90 Base) MCG/ACT inhaler  benzonatate (TESSALON) 100 MG capsule  Coenzyme Q10 400 MG CAPS  fluticasone-salmeterol (ADVAIR HFA) 115-21 MCG/ACT inhaler  levothyroxine (SYNTHROID) 100 MCG tablet  lisinopril (ZESTRIL) 5 MG tablet  LORazepam (ATIVAN) 0.5 MG tablet  meclizine (ANTIVERT) 25 MG tablet  SYNTHROID 100 MCG tablet  Vitamin D3 (CHOLECALCIFEROL) 25 mcg (1000 units) tablet          Physical Exam     First Vitals:  Patient Vitals for the past 24 hrs:   BP Temp Pulse Resp SpO2 Height Weight   10/18/24 0846 (!) 170/74 97.8  F (36.6  C) 67 18 99 % 1.626 m (5' 4\") 66.2 kg (146 lb)         PHYSICAL EXAM:   Physical Exam  Constitutional:       General: She is not in acute distress.     Appearance: She is well-developed. She is not ill-appearing.   HENT:      Head: Normocephalic.      Nose: Nose normal. No congestion.      Mouth/Throat:      Mouth: Mucous membranes are moist.   Eyes:      Conjunctiva/sclera: Conjunctivae normal.   Cardiovascular:      Rate and Rhythm: Normal rate and regular rhythm.      Heart sounds: Normal heart sounds.   Pulmonary:      Effort: Pulmonary effort is normal.      Breath sounds: Normal breath sounds. No wheezing, rhonchi or rales.   Abdominal:      General: Abdomen is flat.      Palpations: There is no mass.      Tenderness: There is no abdominal tenderness. There is no right CVA tenderness, left CVA tenderness or guarding.      Comments: No suprapubic tenderness.   Musculoskeletal:         General: No swelling or tenderness.      Cervical back: Neck supple.   Skin:     General: Skin is warm and dry.      Capillary Refill: Capillary refill takes less than 2 seconds.   Neurological:      General: No focal deficit present.      Mental Status: She is alert and oriented to person, place, and " time.   Psychiatric:         Mood and Affect: Mood normal.         Behavior: Behavior normal.             Results     LAB:  All pertinent labs reviewed and interpreted  Labs Ordered and Resulted from Time of ED Arrival to Time of ED Departure - No data to display    RADIOLOGY:  No orders to display           Rosa Farr PA-C   Emergency Medicine   [facility] EMERGENCY ROOM       Rosa Farr PA-C  10/18/24 1008

## 2024-10-18 NOTE — ED NOTES
Primary assessment done by ERICA Lee/MD VELIA.  Please refer to those notes for assessment.  darlene